# Patient Record
Sex: FEMALE | Race: BLACK OR AFRICAN AMERICAN | NOT HISPANIC OR LATINO | URBAN - METROPOLITAN AREA
[De-identification: names, ages, dates, MRNs, and addresses within clinical notes are randomized per-mention and may not be internally consistent; named-entity substitution may affect disease eponyms.]

---

## 2017-09-29 ENCOUNTER — EMERGENCY (EMERGENCY)
Facility: HOSPITAL | Age: 47
LOS: 1 days | Discharge: HOME | End: 2017-09-29
Attending: EMERGENCY MEDICINE | Admitting: EMERGENCY MEDICINE
Payer: COMMERCIAL

## 2017-09-29 VITALS
HEART RATE: 50 BPM | DIASTOLIC BLOOD PRESSURE: 65 MMHG | SYSTOLIC BLOOD PRESSURE: 104 MMHG | RESPIRATION RATE: 18 BRPM | OXYGEN SATURATION: 100 % | TEMPERATURE: 98 F

## 2017-09-29 DIAGNOSIS — Z93.2 ILEOSTOMY STATUS: Chronic | ICD-10-CM

## 2017-09-29 DIAGNOSIS — K61.1 RECTAL ABSCESS: Chronic | ICD-10-CM

## 2017-09-29 PROCEDURE — 73630 X-RAY EXAM OF FOOT: CPT | Mod: 26,RT

## 2017-09-29 PROCEDURE — 93971 EXTREMITY STUDY: CPT | Mod: 26,LT

## 2017-09-29 PROCEDURE — 99284 EMERGENCY DEPT VISIT MOD MDM: CPT

## 2017-09-29 NOTE — ED PROVIDER NOTE - PROGRESS NOTE DETAILS
The scribe's documentation has been prepared under my direction and personally reviewed by me in its entirety. I confirm that the note above accurately reflects all work, treatment, procedures, and medical decicion-making performed by me.  Robert Butler MD BIJAL Loomis- took sign out from Dr. Butler. Duplex doppler negative. Xray no acute fracture- confirmed and viewed by Dr. Heller. Will DC home with Podiatry referral lsit

## 2017-09-29 NOTE — ED PROVIDER NOTE - ATTENDING CONTRIBUTION TO CARE
agree with resident note  45 y/o F with a PMHx of Crohn's disease, presents to ED c/o right foot pain x2 weeks.  States concerned about DVT.  Also endorses wearing tight shoes.  Pain to dorsum of foot.    PE: well appearing, VSS, CTAB/L; s1 s2 no m/r/g ext: no cellulitis, negative homans signs; no calf swelling

## 2017-09-29 NOTE — ED PROVIDER NOTE - MEDICAL DECISION MAKING DETAILS
47 y/o F pt with right foot pain, concern for stress fx, will XR. Endorses some pain to calf , will do US to r/o DVT. Pt advised to f/u with podiatry.

## 2017-09-29 NOTE — ED PROVIDER NOTE - CARE PLAN
Principal Discharge DX:	Injury of right foot, initial encounter  Instructions for follow-up, activity and diet:	Follow up with your PMD within 24-48 hrs or call our clinic at [phone]. Rest and elevate your foot.  Apply ice for 20 minutes 2-3 times/day as needed for pain and swelling. Take Motrin 600mg every 6-8 hrs with food for pain.  Any worsening pain, swelling, weakness, numbness OR ANY NEW CONCERNING SYMPTOMS return to the Emergency Department. Podiatry referral list provided if symptoms do not improve or worsen.

## 2017-09-29 NOTE — ED PROVIDER NOTE - OBJECTIVE STATEMENT
45 y/o F with a PMHx of Crohn's disease, presents to ED c/o right foot pain x2 weeks. Pt states that pain began after wearing uncomfortable sandals and has persisted. Pt reports pain is on the dorsum of the foot shooting upward to calf with numbness and tingling. Pt states there is no exacerbating or relieving factors. Denies hx of DVT, hx of PE, CP, recent travel, sick contacts, or any other complaints.

## 2017-09-29 NOTE — ED PROVIDER NOTE - PLAN OF CARE
Follow up with your PMD within 24-48 hrs or call our clinic at [phone]. Rest and elevate your foot.  Apply ice for 20 minutes 2-3 times/day as needed for pain and swelling. Take Motrin 600mg every 6-8 hrs with food for pain.  Any worsening pain, swelling, weakness, numbness OR ANY NEW CONCERNING SYMPTOMS return to the Emergency Department. Podiatry referral list provided if symptoms do not improve or worsen.

## 2019-05-09 ENCOUNTER — OUTPATIENT (OUTPATIENT)
Dept: OUTPATIENT SERVICES | Facility: HOSPITAL | Age: 49
LOS: 1 days | End: 2019-05-09
Payer: COMMERCIAL

## 2019-05-09 ENCOUNTER — APPOINTMENT (OUTPATIENT)
Dept: CT IMAGING | Facility: CLINIC | Age: 49
End: 2019-05-09
Payer: COMMERCIAL

## 2019-05-09 DIAGNOSIS — K61.1 RECTAL ABSCESS: Chronic | ICD-10-CM

## 2019-05-09 DIAGNOSIS — K11.20 SIALOADENITIS, UNSPECIFIED: ICD-10-CM

## 2019-05-09 DIAGNOSIS — Z93.2 ILEOSTOMY STATUS: Chronic | ICD-10-CM

## 2019-05-09 PROCEDURE — 70491 CT SOFT TISSUE NECK W/DYE: CPT | Mod: 26

## 2019-05-09 PROCEDURE — 70491 CT SOFT TISSUE NECK W/DYE: CPT

## 2020-10-26 ENCOUNTER — APPOINTMENT (OUTPATIENT)
Dept: SURGERY | Facility: CLINIC | Age: 50
End: 2020-10-26
Payer: COMMERCIAL

## 2020-10-26 VITALS
DIASTOLIC BLOOD PRESSURE: 74 MMHG | BODY MASS INDEX: 33.82 KG/M2 | HEART RATE: 71 BPM | SYSTOLIC BLOOD PRESSURE: 112 MMHG | WEIGHT: 203 LBS | HEIGHT: 65 IN

## 2020-10-26 PROCEDURE — 99072 ADDL SUPL MATRL&STAF TM PHE: CPT

## 2020-10-26 PROCEDURE — 99204 OFFICE O/P NEW MOD 45 MIN: CPT

## 2020-10-26 NOTE — HISTORY OF PRESENT ILLNESS
[de-identified] : 49 yr old female patient with progressively enlarging soft tissue tumor mass, ? lipoma, measuring 10 x 8 cm in posterior mid lower neck shoulder girdle area,

## 2020-10-26 NOTE — PHYSICAL EXAM
[de-identified] : 10 x 8 cm soft tissue tumor mass of posterior lower neck/ shouder girdle area, possibe deep seated lipoma [Midline] : located in midline position [Normal] : orientation to person, place, and time: normal

## 2020-10-26 NOTE — ASSESSMENT
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, large , 10 x 8 cm., in posterior lower neck, progressively enlarging ,\par Concur with patient's wishes for resection because of size and progressive enlargement,\par Will need MRI image assessment for both extent & depth of mass,\par MRI scheduled,\par Will return after MRI for further discussions  prior to scheduling surgery,,\par Inherent complications of this type of surgery, indication, risk/benefit ratio explained,\par Proposed plan of management including possible treatment alternatives, pros & cons, and risks/benefits ratio discussed fully with patient and all questions answered to patient's satisfaction.\par \par

## 2020-11-02 ENCOUNTER — APPOINTMENT (OUTPATIENT)
Dept: OTOLARYNGOLOGY | Facility: CLINIC | Age: 50
End: 2020-11-02
Payer: COMMERCIAL

## 2020-11-02 DIAGNOSIS — K11.9 DISEASE OF SALIVARY GLAND, UNSPECIFIED: ICD-10-CM

## 2020-11-02 DIAGNOSIS — K02.9 DENTAL CARIES, UNSPECIFIED: ICD-10-CM

## 2020-11-02 DIAGNOSIS — M26.609 UNSPECIFIED TEMPOROMANDIBULAR JOINT DISORDER: ICD-10-CM

## 2020-11-02 PROCEDURE — 99203 OFFICE O/P NEW LOW 30 MIN: CPT

## 2020-11-02 PROCEDURE — 99072 ADDL SUPL MATRL&STAF TM PHE: CPT

## 2020-11-02 RX ORDER — BRIMONIDINE TARTRATE, TIMOLOL MALEATE 2; 5 MG/ML; MG/ML
SOLUTION/ DROPS OPHTHALMIC
Refills: 0 | Status: ACTIVE | COMMUNITY

## 2020-11-02 RX ORDER — TRAVOPROST (BENZALKONIUM) 0.004 %
0 DROPS OPHTHALMIC (EYE)
Refills: 0 | Status: ACTIVE | COMMUNITY

## 2020-11-02 NOTE — HISTORY OF PRESENT ILLNESS
[de-identified] : 49F here for eval for gland swelling and pressure in head with otalgia behind the right ear- started over a year ago- saw dentist about 2 weeks ago- told has infection on one of the right lower molars. Recommending removal of teeth.  No otorrhea  - swollen parotid AD - no abx used.  Currently on amoxicillin for the teeth - helping. No pain with biting down .

## 2020-11-02 NOTE — PHYSICAL EXAM
[Midline] : trachea located in midline position [Normal] : no rashes [FreeTextEntry1] : + opening click R>L  [de-identified] : normal salivary flow - parotid and salivary glands

## 2020-11-04 ENCOUNTER — APPOINTMENT (OUTPATIENT)
Dept: GASTROENTEROLOGY | Facility: CLINIC | Age: 50
End: 2020-11-04
Payer: COMMERCIAL

## 2020-11-04 VITALS
SYSTOLIC BLOOD PRESSURE: 122 MMHG | HEART RATE: 76 BPM | TEMPERATURE: 97.8 F | OXYGEN SATURATION: 98 % | DIASTOLIC BLOOD PRESSURE: 76 MMHG | BODY MASS INDEX: 33.99 KG/M2 | HEIGHT: 65 IN | RESPIRATION RATE: 16 BRPM | WEIGHT: 204 LBS

## 2020-11-04 DIAGNOSIS — K56.699 OTHER INTESTINAL OBSTRUCTION UNSPECIFIED AS TO PARTIAL VERSUS COMPLETE OBSTRUCTION: ICD-10-CM

## 2020-11-04 DIAGNOSIS — K50.90 CROHN'S DISEASE, UNSPECIFIED, W/OUT COMPLICATIONS: ICD-10-CM

## 2020-11-04 PROCEDURE — 99072 ADDL SUPL MATRL&STAF TM PHE: CPT

## 2020-11-04 PROCEDURE — 99203 OFFICE O/P NEW LOW 30 MIN: CPT

## 2020-11-04 NOTE — ASSESSMENT
[FreeTextEntry1] : h/o complicated Crohn's disease with rectal stricture, pt took herself off humira, h/o perforation of rectum in 2014 requiring ileostomy during dilation of stricture.\par \par per patient her current GI physcian was unable to do Flex sig last year due to narrowing. Pt has colorectal surgeon at [facility] and is having MRE done today. \par \par No records or labs available for review today.\par \par plan patient advised to see IBD specialist and to work with a surgeon who specializes in IBD given her complicated history,  and  given as referrals.

## 2020-11-04 NOTE — PHYSICAL EXAM
[General Appearance - Alert] : alert [General Appearance - In No Acute Distress] : in no acute distress [General Appearance - Well Nourished] : well nourished [General Appearance - Well Developed] : well developed [Sclera] : the sclera and conjunctiva were normal [Hearing Threshold Finger Rub Not Alexander] : hearing was normal [Respiration, Rhythm And Depth] : normal respiratory rhythm and effort [Auscultation Breath Sounds / Voice Sounds] : lungs were clear to auscultation bilaterally [Heart Rate And Rhythm] : heart rate was normal and rhythm regular [Heart Sounds] : normal S1 and S2 [Bowel Sounds] : normal bowel sounds [Abdomen Soft] : soft [No CVA Tenderness] : no ~M costovertebral angle tenderness [Nail Clubbing] : no clubbing  or cyanosis of the fingernails [FreeTextEntry1] : well healed scar [Abnormal Walk] : normal gait [] : no rash [Skin Lesions] : no skin lesions [No Focal Deficits] : no focal deficits [Oriented To Time, Place, And Person] : oriented to person, place, and time

## 2020-11-04 NOTE — HISTORY OF PRESENT ILLNESS
[FreeTextEntry1] : 50 yo female with h/o Crohn;s disease in 1986 , h/o anal stricture, s/p ileostomy in 2013 after rectal perforation from dilation, patient s/p reanastamosis in 2014. Patient seeing  GI   currently.\par Patient's last visit was last year.  Patient seeing  and had physical done. Last blood work in 05/2020\par \par Patient  was on humira and unable to have Flex sigmoidoscopy due to narrowing of rectum, and patient was seeing a colorectal surgeon and having MRE done. Patient stopped humiral on her own fear of covid.\par only took 2 months of humiral last fall.\par \par Patient reports 2 or 3 loose bms daily,  can be large amounts. , no brbpr, no melena. patient reports right sided abdominal pain, 5/10 pain scale, sharp, lasts for minutes, not worse with food, does not radiate to back . no weight loss.

## 2020-11-04 NOTE — REVIEW OF SYSTEMS
[As Noted in HPI] : as noted in HPI [Fever] : no fever [Chills] : no chills [Eyesight Problems] : no eyesight problems [Nosebleeds] : no nosebleeds [Chest Pain] : no chest pain [Pelvic Pain] : no pelvic pain [Arthralgias] : no arthralgias [Joint Pain] : no joint pain [Dizziness] : no dizziness [Fainting] : no fainting [Anxiety] : no anxiety [Depression] : no depression [Muscle Weakness] : no muscle weakness [Easy Bleeding] : no tendency for easy bleeding [Easy Bruising] : no tendency for easy bruising

## 2020-11-10 ENCOUNTER — APPOINTMENT (OUTPATIENT)
Dept: CT IMAGING | Facility: HOSPITAL | Age: 50
End: 2020-11-10

## 2020-11-16 ENCOUNTER — APPOINTMENT (OUTPATIENT)
Dept: DERMATOLOGY | Facility: CLINIC | Age: 50
End: 2020-11-16

## 2020-11-16 ENCOUNTER — APPOINTMENT (OUTPATIENT)
Dept: DERMATOLOGY | Facility: CLINIC | Age: 50
End: 2020-11-16
Payer: COMMERCIAL

## 2020-11-16 VITALS — WEIGHT: 203 LBS | BODY MASS INDEX: 33.82 KG/M2 | HEIGHT: 65 IN

## 2020-11-16 DIAGNOSIS — R22.9 LOCALIZED SWELLING, MASS AND LUMP, UNSPECIFIED: ICD-10-CM

## 2020-11-16 PROCEDURE — 99072 ADDL SUPL MATRL&STAF TM PHE: CPT

## 2020-11-16 PROCEDURE — 99203 OFFICE O/P NEW LOW 30 MIN: CPT

## 2020-11-24 ENCOUNTER — APPOINTMENT (OUTPATIENT)
Dept: MRI IMAGING | Facility: HOSPITAL | Age: 50
End: 2020-11-24

## 2020-11-25 ENCOUNTER — RESULT REVIEW (OUTPATIENT)
Age: 50
End: 2020-11-25

## 2020-11-30 ENCOUNTER — NON-APPOINTMENT (OUTPATIENT)
Age: 50
End: 2020-11-30

## 2020-12-01 ENCOUNTER — OUTPATIENT (OUTPATIENT)
Dept: OUTPATIENT SERVICES | Facility: HOSPITAL | Age: 50
LOS: 1 days | End: 2020-12-01
Payer: COMMERCIAL

## 2020-12-01 ENCOUNTER — RESULT REVIEW (OUTPATIENT)
Age: 50
End: 2020-12-01

## 2020-12-01 ENCOUNTER — APPOINTMENT (OUTPATIENT)
Dept: MRI IMAGING | Facility: CLINIC | Age: 50
End: 2020-12-01
Payer: COMMERCIAL

## 2020-12-01 DIAGNOSIS — K61.1 RECTAL ABSCESS: Chronic | ICD-10-CM

## 2020-12-01 DIAGNOSIS — Z93.2 ILEOSTOMY STATUS: Chronic | ICD-10-CM

## 2020-12-01 DIAGNOSIS — R22.1 LOCALIZED SWELLING, MASS AND LUMP, NECK: ICD-10-CM

## 2020-12-01 PROCEDURE — 70543 MRI ORBT/FAC/NCK W/O &W/DYE: CPT | Mod: 26

## 2020-12-01 PROCEDURE — A9585: CPT

## 2020-12-01 PROCEDURE — 70543 MRI ORBT/FAC/NCK W/O &W/DYE: CPT

## 2020-12-14 ENCOUNTER — APPOINTMENT (OUTPATIENT)
Dept: SURGERY | Facility: CLINIC | Age: 50
End: 2020-12-14
Payer: COMMERCIAL

## 2020-12-14 PROCEDURE — 99072 ADDL SUPL MATRL&STAF TM PHE: CPT

## 2020-12-14 PROCEDURE — 99214 OFFICE O/P EST MOD 30 MIN: CPT

## 2020-12-14 NOTE — ASSESSMENT
[FreeTextEntry1] : Based on recent MRI , will observe conservatively and reserve resection only if this is an enlarging lipoma.\par Proposed plan of management including possible treatment alternatives, pros & cons, and risks/benefits ratio discussed fully with patient and all questions answered to patient's satisfaction.\par Return in 4 months

## 2020-12-14 NOTE — HISTORY OF PRESENT ILLNESS
[de-identified] : MRI results demonstrate poorly encapsulated fatty tissue  compatible more with lipodystrophy fat redistribution in setting of Cushing's syndrome,\par Patient has Crohn's disease and had been on steroids as well as other meds that may  have contributed to this,  \par clinical size : 11 x 8 cm\par MRI : 9.5 x 6.1 cm\par

## 2020-12-22 ENCOUNTER — APPOINTMENT (OUTPATIENT)
Dept: PLASTIC SURGERY | Facility: CLINIC | Age: 50
End: 2020-12-22
Payer: COMMERCIAL

## 2020-12-22 VITALS
DIASTOLIC BLOOD PRESSURE: 58 MMHG | HEIGHT: 65 IN | BODY MASS INDEX: 33.82 KG/M2 | WEIGHT: 203 LBS | HEART RATE: 61 BPM | TEMPERATURE: 97.2 F | OXYGEN SATURATION: 100 % | SYSTOLIC BLOOD PRESSURE: 92 MMHG

## 2020-12-22 PROCEDURE — XXXXX: CPT

## 2020-12-22 NOTE — REASON FOR VISIT
[Initial Evaluation] : an initial evaluation [FreeTextEntry1] : 50 year old lady is in the office today due to lipoma on right neck. She had this for 18 years and grow in size 5 years. Imaging (MRI) 12/01/2020. No treatment , biopsy in 2012. No family hx of skin cancer. Otherwise the patient is doing well.

## 2021-03-04 ENCOUNTER — EMERGENCY (EMERGENCY)
Facility: HOSPITAL | Age: 51
LOS: 1 days | Discharge: HOME | End: 2021-03-04
Attending: STUDENT IN AN ORGANIZED HEALTH CARE EDUCATION/TRAINING PROGRAM
Payer: COMMERCIAL

## 2021-03-04 VITALS
HEART RATE: 59 BPM | RESPIRATION RATE: 17 BRPM | DIASTOLIC BLOOD PRESSURE: 60 MMHG | TEMPERATURE: 98 F | SYSTOLIC BLOOD PRESSURE: 128 MMHG | OXYGEN SATURATION: 98 %

## 2021-03-04 VITALS
OXYGEN SATURATION: 100 % | TEMPERATURE: 98 F | SYSTOLIC BLOOD PRESSURE: 165 MMHG | HEART RATE: 61 BPM | WEIGHT: 203.93 LBS | DIASTOLIC BLOOD PRESSURE: 103 MMHG | RESPIRATION RATE: 18 BRPM | HEIGHT: 65 IN

## 2021-03-04 DIAGNOSIS — Z93.2 ILEOSTOMY STATUS: Chronic | ICD-10-CM

## 2021-03-04 DIAGNOSIS — K61.1 RECTAL ABSCESS: Chronic | ICD-10-CM

## 2021-03-04 PROCEDURE — 99283 EMERGENCY DEPT VISIT LOW MDM: CPT

## 2021-03-04 RX ORDER — ACETAMINOPHEN 500 MG
650 TABLET ORAL ONCE
Refills: 0 | Status: COMPLETED | OUTPATIENT
Start: 2021-03-04 | End: 2021-03-04

## 2021-03-04 RX ADMIN — Medication 650 MILLIGRAM(S): at 03:57
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
## 2021-03-04 NOTE — ED PROVIDER NOTE - NS ED ROS FT
Gen: Denies fever.   HEENT: +headache. Denies congestion.  CV: Denies chest pain. Denies lightheadedness.  Skin: Denies rash.   Resp: Denies SOB. Denies cough.  GI: Denies abd pain. Denies nausea. Denies vomiting. Denies diarrhea. Denies melena. Denies hematochezia.  Msk: Denies extremity swelling. Denies extremity pain.  : Denies dysuria. Denies hematuria.  Neuro: Denies LOC. Denies dizziness. Denies new numbness/tingling. Denies new focal weakness.  Psych: Denies SI
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
## 2021-03-04 NOTE — ED PROVIDER NOTE - PROGRESS NOTE DETAILS
Jean Buck PGY1: Exam w/o concerning findings or neuro deficits. Pt comfortable with returning home with follow-up. Pt understands plan, and has been provided with return precautions, and understands reasons to return to the ER.

## 2021-03-04 NOTE — ED PROVIDER NOTE - NSFOLLOWUPINSTRUCTIONS_ED_ALL_ED_FT
1. You presented to the emergency department for:  head injury    2. Your evaluation in the emergency department included a physician evaluation. Your evaluation did not reveal any findings indicating the need for admission to the hospital or an emergent intervention at this time.     3. It is recommended that you follow-up with the concussion clinic as needed as discussed for a repeat evaluation, and potentially further testing and treatment. The contact information to arrange an appointment is available in your paper work.    Follow-up with the [facility] Address: [facility] [id number], [place] [address] Phone: [phone]    If needed, to arrange an appointment with a primary care provider please call: [phone]    4. For headache you may take 500-1000mg Tylenol every 8 hours - as needed.  This is an over-the-counter medication - please read the warnings on the label. This medication comes with certain risks and side effects that are outlined on the packaging, especially if you are taking it for a prolonged period of time. If you have any questions regarding its use, you may refer them to your local pharmacist.    5. PLEASE RETURN TO THE EMERGENCY DEPARTMENT IMMEDIATELY IF you have any fevers, uncontrollable nausea and vomiting, lightheadedness, inability to tolerate eating and drinking, difficulty breathing, chest pain, severe increase in your symptoms or pain, or an other new symptoms that concern you.
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
## 2021-03-04 NOTE — ED PROVIDER NOTE - PATIENT PORTAL LINK FT
You can access the FollowMyHealth Patient Portal offered by [facility] by registering at the following website: [email or web address]. By joining [facility]’s FollowMyHealth portal, you will also be able to view your health information using other applications (apps) compatible with our system.
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
## 2021-10-06 ENCOUNTER — TRANSCRIPTION ENCOUNTER (OUTPATIENT)
Age: 51
End: 2021-10-06

## 2021-10-27 ENCOUNTER — TRANSCRIPTION ENCOUNTER (OUTPATIENT)
Age: 51
End: 2021-10-27

## 2021-11-30 ENCOUNTER — APPOINTMENT (OUTPATIENT)
Dept: SURGERY | Facility: CLINIC | Age: 51
End: 2021-11-30
Payer: COMMERCIAL

## 2021-11-30 VITALS
HEART RATE: 66 BPM | SYSTOLIC BLOOD PRESSURE: 163 MMHG | BODY MASS INDEX: 34.99 KG/M2 | DIASTOLIC BLOOD PRESSURE: 82 MMHG | WEIGHT: 210 LBS | HEIGHT: 65 IN

## 2021-11-30 PROCEDURE — 99203 OFFICE O/P NEW LOW 30 MIN: CPT

## 2021-12-04 NOTE — CONSULT LETTER
[Dear  ___] : Dear  [unfilled], [Consult Letter:] : I had the pleasure of evaluating your patient, [unfilled]. [Please see my note below.] : Please see my note below. [Consult Closing:] : Thank you very much for allowing me to participate in the care of this patient.  If you have any questions, please do not hesitate to contact me. [Sincerely,] : Sincerely, [FreeTextEntry2] : Dr. Lilo Astudillo [FreeTextEntry3] : Royal Andersen MD, FACS\par System Director, Endocrine Surgery\par [facility]\par Assistant Clinical Professor of Surgery\par [employer] [facility] [employer] [facility]

## 2021-12-04 NOTE — HISTORY OF PRESENT ILLNESS
[de-identified] : 20 year history of enlarging neck mass. denies pain, drainage, infection, history of  trauma, shoulder weakness or sensory changes. MRI shows 9 cm fatty tissue prominence.  I have reviewed all old and new data and available images.\par

## 2021-12-04 NOTE — ASSESSMENT
[FreeTextEntry1] : appears to be lipoma. discussed options for management including active surveillance vs excision.  potential for recurrence, scarring, sensory changes and symmetry discussed. risks, benefits and alternatives discussed at length.  I have discussed with the patient the anatomy of the area, the pathophysiology of the disease process and the rationale for surgery.  The attendant risks, possible complications and expected postoperative course have been discussed in detail.  I have given the patient the opportunity to ask questions, and all questions have been answered to the patient's satisfaction, and they wish to proceed with the planned procedure.  to be scheduled ambulatory at [facility]

## 2021-12-04 NOTE — PHYSICAL EXAM
[de-identified] : 10 cm right posterior neck mass adjacent to C7/T1 spinal process, soft, mobile [de-identified] : no palpable thyroid nodules [Midline] : located in midline position [Normal] : orientation to person, place, and time: normal

## 2021-12-12 ENCOUNTER — EMERGENCY (EMERGENCY)
Facility: HOSPITAL | Age: 51
LOS: 1 days | Discharge: HOME | End: 2021-12-12
Attending: STUDENT IN AN ORGANIZED HEALTH CARE EDUCATION/TRAINING PROGRAM
Payer: COMMERCIAL

## 2021-12-12 VITALS
HEART RATE: 64 BPM | HEIGHT: 65 IN | WEIGHT: 210.1 LBS | TEMPERATURE: 98 F | DIASTOLIC BLOOD PRESSURE: 81 MMHG | RESPIRATION RATE: 16 BRPM | OXYGEN SATURATION: 98 % | SYSTOLIC BLOOD PRESSURE: 171 MMHG

## 2021-12-12 DIAGNOSIS — Z93.2 ILEOSTOMY STATUS: Chronic | ICD-10-CM

## 2021-12-12 DIAGNOSIS — K61.1 RECTAL ABSCESS: Chronic | ICD-10-CM

## 2021-12-12 PROCEDURE — 99285 EMERGENCY DEPT VISIT HI MDM: CPT | Mod: 25

## 2021-12-12 PROCEDURE — 93010 ELECTROCARDIOGRAM REPORT: CPT | Mod: NC

## 2021-12-13 VITALS
SYSTOLIC BLOOD PRESSURE: 113 MMHG | OXYGEN SATURATION: 98 % | HEART RATE: 60 BPM | RESPIRATION RATE: 16 BRPM | TEMPERATURE: 98 F | DIASTOLIC BLOOD PRESSURE: 73 MMHG

## 2021-12-13 LAB
ALBUMIN SERPL ELPH-MCNC: 4.4 G/DL — SIGNIFICANT CHANGE UP (ref 3.3–5)
ALP SERPL-CCNC: 94 U/L — SIGNIFICANT CHANGE UP (ref 40–120)
ALT FLD-CCNC: 15 U/L — SIGNIFICANT CHANGE UP (ref 10–45)
ANION GAP SERPL CALC-SCNC: 15 MMOL/L — SIGNIFICANT CHANGE UP (ref 5–17)
AST SERPL-CCNC: 22 U/L — SIGNIFICANT CHANGE UP (ref 10–40)
BASOPHILS # BLD AUTO: 0 K/UL — SIGNIFICANT CHANGE UP (ref 0–0.2)
BASOPHILS NFR BLD AUTO: 0 % — SIGNIFICANT CHANGE UP (ref 0–2)
BILIRUB SERPL-MCNC: 0.5 MG/DL — SIGNIFICANT CHANGE UP (ref 0.2–1.2)
BUN SERPL-MCNC: 19 MG/DL — SIGNIFICANT CHANGE UP (ref 7–23)
CALCIUM SERPL-MCNC: 9.5 MG/DL — SIGNIFICANT CHANGE UP (ref 8.4–10.5)
CHLORIDE SERPL-SCNC: 106 MMOL/L — SIGNIFICANT CHANGE UP (ref 96–108)
CO2 SERPL-SCNC: 17 MMOL/L — LOW (ref 22–31)
CREAT SERPL-MCNC: 0.73 MG/DL — SIGNIFICANT CHANGE UP (ref 0.5–1.3)
EOSINOPHIL # BLD AUTO: 0.27 K/UL — SIGNIFICANT CHANGE UP (ref 0–0.5)
EOSINOPHIL NFR BLD AUTO: 3.4 % — SIGNIFICANT CHANGE UP (ref 0–6)
GLUCOSE SERPL-MCNC: 103 MG/DL — HIGH (ref 70–99)
HCT VFR BLD CALC: 35.1 % — SIGNIFICANT CHANGE UP (ref 34.5–45)
HGB BLD-MCNC: 11.1 G/DL — LOW (ref 11.5–15.5)
LYMPHOCYTES # BLD AUTO: 2.15 K/UL — SIGNIFICANT CHANGE UP (ref 1–3.3)
LYMPHOCYTES # BLD AUTO: 27.3 % — SIGNIFICANT CHANGE UP (ref 13–44)
MAGNESIUM SERPL-MCNC: 1.6 MG/DL — SIGNIFICANT CHANGE UP (ref 1.6–2.6)
MCHC RBC-ENTMCNC: 26.6 PG — LOW (ref 27–34)
MCHC RBC-ENTMCNC: 31.6 GM/DL — LOW (ref 32–36)
MCV RBC AUTO: 84 FL — SIGNIFICANT CHANGE UP (ref 80–100)
MONOCYTES # BLD AUTO: 0.81 K/UL — SIGNIFICANT CHANGE UP (ref 0–0.9)
MONOCYTES NFR BLD AUTO: 10.3 % — SIGNIFICANT CHANGE UP (ref 2–14)
NEUTROPHILS # BLD AUTO: 4.51 K/UL — SIGNIFICANT CHANGE UP (ref 1.8–7.4)
NEUTROPHILS NFR BLD AUTO: 57.3 % — SIGNIFICANT CHANGE UP (ref 43–77)
PLATELET # BLD AUTO: 226 K/UL — SIGNIFICANT CHANGE UP (ref 150–400)
POTASSIUM SERPL-MCNC: 3.9 MMOL/L — SIGNIFICANT CHANGE UP (ref 3.5–5.3)
POTASSIUM SERPL-SCNC: 3.9 MMOL/L — SIGNIFICANT CHANGE UP (ref 3.5–5.3)
PROT SERPL-MCNC: 8 G/DL — SIGNIFICANT CHANGE UP (ref 6–8.3)
RBC # BLD: 4.18 M/UL — SIGNIFICANT CHANGE UP (ref 3.8–5.2)
RBC # FLD: 15.3 % — HIGH (ref 10.3–14.5)
SARS-COV-2 RNA SPEC QL NAA+PROBE: SIGNIFICANT CHANGE UP
SODIUM SERPL-SCNC: 138 MMOL/L — SIGNIFICANT CHANGE UP (ref 135–145)
TROPONIN T, HIGH SENSITIVITY RESULT: 7 NG/L — SIGNIFICANT CHANGE UP (ref 0–51)
TROPONIN T, HIGH SENSITIVITY RESULT: 7 NG/L — SIGNIFICANT CHANGE UP (ref 0–51)
WBC # BLD: 7.87 K/UL — SIGNIFICANT CHANGE UP (ref 3.8–10.5)
WBC # FLD AUTO: 7.87 K/UL — SIGNIFICANT CHANGE UP (ref 3.8–10.5)

## 2021-12-13 PROCEDURE — 71046 X-RAY EXAM CHEST 2 VIEWS: CPT

## 2021-12-13 PROCEDURE — 83735 ASSAY OF MAGNESIUM: CPT

## 2021-12-13 PROCEDURE — 71046 X-RAY EXAM CHEST 2 VIEWS: CPT | Mod: 26

## 2021-12-13 PROCEDURE — 85025 COMPLETE CBC W/AUTO DIFF WBC: CPT

## 2021-12-13 PROCEDURE — 99284 EMERGENCY DEPT VISIT MOD MDM: CPT | Mod: 25

## 2021-12-13 PROCEDURE — 87635 SARS-COV-2 COVID-19 AMP PRB: CPT

## 2021-12-13 PROCEDURE — 93005 ELECTROCARDIOGRAM TRACING: CPT

## 2021-12-13 PROCEDURE — 84484 ASSAY OF TROPONIN QUANT: CPT

## 2021-12-13 PROCEDURE — 36415 COLL VENOUS BLD VENIPUNCTURE: CPT

## 2021-12-13 PROCEDURE — 80053 COMPREHEN METABOLIC PANEL: CPT

## 2021-12-13 RX ORDER — ASPIRIN/CALCIUM CARB/MAGNESIUM 324 MG
162 TABLET ORAL ONCE
Refills: 0 | Status: COMPLETED | OUTPATIENT
Start: 2021-12-13 | End: 2021-12-13

## 2021-12-13 RX ADMIN — Medication 162 MILLIGRAM(S): at 02:36

## 2021-12-13 NOTE — ED PROVIDER NOTE - CLINICAL SUMMARY MEDICAL DECISION MAKING FREE TEXT BOX
52 y/o female with pmhx of Crohns diease presenting with palpitations with recent dental procedure yesterday, well appearing, afebrile, hemodynamically stable with no chest pain. Cardiac w/u; EKG/Trop/CXR and will check labs for signs of infection vs. electrolyte abnormalities 50 y/o female with pmhx of Crohns diease presenting with palpitations with recent dental procedure yesterday, well appearing, afebrile, hemodynamically stable with no chest pain. Cardiac w/u; EKG/Trop/CXR and will check labs for signs of infection vs. electrolyte abnormalities    Attending MD Welch: Agree with above. Ddx includes ACS vs PE vs arrythmia vs lyte abnormality.   Doubt ACS given patient not having overt CP, but will send trop.  Doubt PE given patient not tachy, not hypoxic, no SOB, no CP, no signs or sxs of DVT.   Doubt arrythmia given EKG and intervals normal.   Will eval lyte abnormality with labs.

## 2021-12-13 NOTE — ED PROVIDER NOTE - NSFOLLOWUPINSTRUCTIONS_ED_ALL_ED_FT
Please follow up with your cardiologist as we discussed     A palpitation is the feeling that your heartbeat is irregular or is faster than normal. It may feel like your heart is fluttering or skipping a beat. They may be caused by many things, including smoking, caffeine, alcohol, stress, and certain medicines. Although most causes of palpitations are not serious, palpitations can be a sign of a serious medical problem. Avoid caffeine, alcohol, and tobacco products at home. Try to reduce stress and anxiety and make sure to get plenty of rest.     SEEK IMMEDIATE MEDICAL CARE IF YOU HAVE ANY OF THE FOLLOWING SYMPTOMS: chest pain, shortness of breath, severe headache, dizziness/lightheadedness, or fainting.

## 2021-12-13 NOTE — ED PROVIDER NOTE - OBJECTIVE STATEMENT
52 y/o female with pmhx of crohns diease presenting with palpitations. Recent dental procedure yesterday and has been taking abx for 1 week (amoxicillin and recently switched to augmentin). Palpitations started in the afternoon after taking abx and cleaning around the house, have been intermittent, with no associated chest pain, diaphoresis, SOB, weakness, numbness/tingling, LOC, fevers/chills, n/v/d, cough, rashes, or changes in urination. Has noted persisted swelling in mouth since procedure but denies discharge, bleeding, or bad taste.

## 2021-12-13 NOTE — ED PROVIDER NOTE - PHYSICAL EXAMINATION
Gen: WDWN, NAD, comfortable appearing, afebrile   HEENT: PERRLA, EOMI, no nasal discharge, mucous membranes moist, upper lip symmetric swelling with no oropharyngeal edema/erythema/exudates/bleeding   CV: RRR, +S1/S2, no M/R/G, equal b/l radial pulses 2+  Resp: CTAB, no W/R/R, SPO2 100% on RA, no increased WOB   GI: Abdomen soft non-distended, NTTP, no masses/organomegaly   MSK/Skin: No CVA tenderness, no open wounds, no bruising, no LE edema, no splinter hemorrhages   Neuro: CN2-12 grossly intact, A&Ox4, MS +5/5 in UE and LE BL, gross sensation intact in UE and LE BL  Psych: appropriate mood Attending MD Welch :   PHYSICAL EXAM:    GENERAL: NAD  HEENT: PERRLA, EOMI, no nasal discharge, mucous membranes moist, upper lip symmetric swelling with no oropharyngeal edema/erythema/exudates/bleeding   CHEST/LUNG: Chest rise equal bilaterally  HEART: Regular rate and rhythm  ABDOMEN: Soft, Nontender, Nondistended  EXTREMITIES:  Extremities warm  PSYCH: A&Ox3  SKIN: No obvious rashes or lesions

## 2021-12-13 NOTE — ED ADULT NURSE NOTE - OBJECTIVE STATEMENT
51y F arrived to the ED c/o palpitations. Pt presents with palpitations starting this afternoon while cleaning. Pt reports being recently placed on amoxicillin x1 week ago after a dental procedure and was switched to Augmentin and took first dose today. Pt endorses SOB with palpitations denies chest pain, HA, N/V/D, abdominal pain, fever/chills, urinary symptoms, hematuria, weakness, dizziness, numbness, tinging. Pt placed in position of comfort.  Pt denies needs at this time.

## 2021-12-13 NOTE — ED PROVIDER NOTE - NS ED ROS FT
Gen: Denies fever, weight loss  CV: Denies chest pain  Skin: Denies rash, erythema, color changes  Resp: Denies SOB, cough  Endo: Denies sensitivity to heat, cold, increased urination  GI: Denies diarrhea, constipation, nausea, vomiting  Msk: Denies back pain, LE swelling, extremity pain  : Denies dysuria, increased frequency  Neuro: Denies LOC, weakness, numbness/tingling

## 2021-12-13 NOTE — ED PROVIDER NOTE - PROGRESS NOTE DETAILS
Vaibhav, PGY-2  No palpitations, trop wnl, no chest pain, NSR on monitor; will d/c with cardiology f/u and return precautions Vaibhav, PGY-2  No palpitations, trop wnl, no chest pain, NSR on monitor; will d/c with cardiology f/u and return precautions. Patient asymptomatic on reassessment, will follow-up PMD.

## 2021-12-13 NOTE — ED ADULT NURSE NOTE - NSICDXPASTMEDICALHX_GEN_ALL_CORE_FT
PAST MEDICAL HISTORY:  Anxiety and depression     Crohn disease     Glaucoma     Ileostomy status

## 2021-12-13 NOTE — ED ADULT NURSE NOTE - NSIMPLEMENTINTERV_GEN_ALL_ED
Implemented All Universal Safety Interventions:  [place] to call system. Call bell, personal items and telephone within reach. Instruct patient to call for assistance. Room bathroom lighting operational. Non-slip footwear when patient is off stretcher. Physically safe environment: no spills, clutter or unnecessary equipment. Stretcher in lowest position, wheels locked, appropriate side rails in place.

## 2021-12-13 NOTE — ED ADULT NURSE NOTE - NSICDXPASTSURGICALHX_GEN_ALL_CORE_FT
PAST SURGICAL HISTORY:  Ileostomy status partial closure 3/2014    Rectal abscess I&D 1985    S/P ileostomy 2/11/13

## 2021-12-13 NOTE — ED PROVIDER NOTE - PATIENT PORTAL LINK FT
You can access the FollowMyHealth Patient Portal offered by [facility] by registering at the following website: [email or web address]. By joining [employer]’s FollowMyHealth portal, you will also be able to view your health information using other applications (apps) compatible with our system.

## 2021-12-13 NOTE — ED ADULT NURSE REASSESSMENT NOTE - NS ED NURSE REASSESS COMMENT FT1
Patient d/c. Reviewed d/c paperwork with patients, all questions answered at this time. Patient verbalizes understanding. IV removed. Patient instructed to return to the ER for any worsening s/s including chest pain, SOB, fever, n/v/d. Patient alert and stable at time of d/c. Patient will follow up with cardiology.

## 2021-12-14 NOTE — ED POST DISCHARGE NOTE - DETAILS
12/14 Barrington Ocampo PA-C: Called all #s in chart. s/w emergency contact cousin Kailash who will have pt call [phone] admin number. Appreciative of call. 12/15: spoke with patient made aware of recommendation - Sophia Chawla PA-C

## 2022-02-17 ENCOUNTER — OUTPATIENT (OUTPATIENT)
Dept: OUTPATIENT SERVICES | Facility: HOSPITAL | Age: 52
LOS: 1 days | End: 2022-02-17

## 2022-02-17 VITALS
WEIGHT: 216.05 LBS | HEART RATE: 68 BPM | SYSTOLIC BLOOD PRESSURE: 120 MMHG | TEMPERATURE: 97 F | DIASTOLIC BLOOD PRESSURE: 70 MMHG | OXYGEN SATURATION: 99 % | HEIGHT: 65 IN | RESPIRATION RATE: 16 BRPM

## 2022-02-17 DIAGNOSIS — Z93.2 ILEOSTOMY STATUS: Chronic | ICD-10-CM

## 2022-02-17 DIAGNOSIS — D48.5 NEOPLASM OF UNCERTAIN BEHAVIOR OF SKIN: ICD-10-CM

## 2022-02-17 DIAGNOSIS — G47.33 OBSTRUCTIVE SLEEP APNEA (ADULT) (PEDIATRIC): ICD-10-CM

## 2022-02-17 DIAGNOSIS — Z86.69 PERSONAL HISTORY OF OTHER DISEASES OF THE NERVOUS SYSTEM AND SENSE ORGANS: ICD-10-CM

## 2022-02-17 DIAGNOSIS — K61.1 RECTAL ABSCESS: Chronic | ICD-10-CM

## 2022-02-17 DIAGNOSIS — R73.03 PREDIABETES: ICD-10-CM

## 2022-02-17 LAB
A1C WITH ESTIMATED AVERAGE GLUCOSE RESULT: 5.5 % — SIGNIFICANT CHANGE UP (ref 4–5.6)
ALBUMIN SERPL ELPH-MCNC: 4.4 G/DL — SIGNIFICANT CHANGE UP (ref 3.3–5)
ALP SERPL-CCNC: 95 U/L — SIGNIFICANT CHANGE UP (ref 40–120)
ALT FLD-CCNC: 20 U/L — SIGNIFICANT CHANGE UP (ref 4–33)
ANION GAP SERPL CALC-SCNC: 12 MMOL/L — SIGNIFICANT CHANGE UP (ref 7–14)
AST SERPL-CCNC: 22 U/L — SIGNIFICANT CHANGE UP (ref 4–32)
BILIRUB SERPL-MCNC: 0.7 MG/DL — SIGNIFICANT CHANGE UP (ref 0.2–1.2)
BLD GP AB SCN SERPL QL: NEGATIVE — SIGNIFICANT CHANGE UP
BUN SERPL-MCNC: 18 MG/DL — SIGNIFICANT CHANGE UP (ref 7–23)
CALCIUM SERPL-MCNC: 9.2 MG/DL — SIGNIFICANT CHANGE UP (ref 8.4–10.5)
CHLORIDE SERPL-SCNC: 105 MMOL/L — SIGNIFICANT CHANGE UP (ref 98–107)
CO2 SERPL-SCNC: 23 MMOL/L — SIGNIFICANT CHANGE UP (ref 22–31)
CREAT SERPL-MCNC: 0.81 MG/DL — SIGNIFICANT CHANGE UP (ref 0.5–1.3)
ESTIMATED AVERAGE GLUCOSE: 111 — SIGNIFICANT CHANGE UP
GLUCOSE SERPL-MCNC: 96 MG/DL — SIGNIFICANT CHANGE UP (ref 70–99)
HCG SERPL-ACNC: <5 MIU/ML — SIGNIFICANT CHANGE UP
HCT VFR BLD CALC: 35.4 % — SIGNIFICANT CHANGE UP (ref 34.5–45)
HGB BLD-MCNC: 10.9 G/DL — LOW (ref 11.5–15.5)
MCHC RBC-ENTMCNC: 26.8 PG — LOW (ref 27–34)
MCHC RBC-ENTMCNC: 30.8 GM/DL — LOW (ref 32–36)
MCV RBC AUTO: 87 FL — SIGNIFICANT CHANGE UP (ref 80–100)
NRBC # BLD: 0 /100 WBCS — SIGNIFICANT CHANGE UP
NRBC # FLD: 0 K/UL — SIGNIFICANT CHANGE UP
PLATELET # BLD AUTO: 192 K/UL — SIGNIFICANT CHANGE UP (ref 150–400)
POTASSIUM SERPL-MCNC: 4.4 MMOL/L — SIGNIFICANT CHANGE UP (ref 3.5–5.3)
POTASSIUM SERPL-SCNC: 4.4 MMOL/L — SIGNIFICANT CHANGE UP (ref 3.5–5.3)
PROT SERPL-MCNC: 7.2 G/DL — SIGNIFICANT CHANGE UP (ref 6–8.3)
RBC # BLD: 4.07 M/UL — SIGNIFICANT CHANGE UP (ref 3.8–5.2)
RBC # FLD: 16.3 % — HIGH (ref 10.3–14.5)
RH IG SCN BLD-IMP: POSITIVE — SIGNIFICANT CHANGE UP
SODIUM SERPL-SCNC: 140 MMOL/L — SIGNIFICANT CHANGE UP (ref 135–145)
WBC # BLD: 4.57 K/UL — SIGNIFICANT CHANGE UP (ref 3.8–10.5)
WBC # FLD AUTO: 4.57 K/UL — SIGNIFICANT CHANGE UP (ref 3.8–10.5)

## 2022-02-17 NOTE — H&P PST ADULT - NEGATIVE CARDIOVASCULAR SYMPTOMS
no chest pain/no palpitations/no dyspnea on exertion/no orthopnea/no paroxysmal nocturnal dyspnea/no peripheral edema/no claudication In PST/no chest pain/no palpitations/no orthopnea/no paroxysmal nocturnal dyspnea/no peripheral edema/no claudication

## 2022-02-17 NOTE — H&P PST ADULT - SYMPTOMS
none pt denies cp, denies palpitations , pt reports sob climbing 1 flight stairs . Pt c/o Palpitations 12/21 ; pt denies further c/o palpitations. Pt to cardiologist 2 weeks ago ; pt s/p st/echo Pt reports h/o sob after climbing 1 flight stairs ; pt reports recent cardiac evaluation ; s/p ST/ Echo 2/22. In pst ; pt denies cp, denies palpitations, denies sob/none Pt reports h/o sob after climbing 1 flight stairs ; pt reports recent cardiac evaluation ; s/p EKG, ST/ Echo 2/22. In pst ; pt denies cp, denies palpitations, denies sob/none

## 2022-02-17 NOTE — H&P PST ADULT - NSANTHOSAYNRD_GEN_A_CORE
No. TAMAR screening performed.  STOP BANG Legend: 0-2 = LOW Risk; 3-4 = INTERMEDIATE Risk; 5-8 = HIGH Risk

## 2022-02-17 NOTE — H&P PST ADULT - BRAND OF FIRST COVID-19 BOOSTER
Pfizer

## 2022-02-17 NOTE — H&P PST ADULT - HISTORY OF PRESENT ILLNESS
Pt is a 51 y.o. female ; pt reports h/o        Pt is a 51 y.o. female ; pt reports h/o mass posterior neck x 5 years ; pt s/p MRI ; t to surgeon ; pt now presents for Excision Posterior Neck Mass        Pt is a 51 y.o. female ; pt reports h/o mass posterior neck x 5 years ; pt s/p MRI ; pt  to surgeon ; pt now presents for Excision Posterior Neck Mass

## 2022-02-17 NOTE — H&P PST ADULT - NSICDXPASTSURGICALHX_GEN_ALL_CORE_FT
PAST SURGICAL HISTORY:  Ileostomy status partial closure 3/2014 Complete Closure    Rectal abscess I&D 1985    S/P ileostomy 2/11/13     PAST SURGICAL HISTORY:  Ileostomy status partial closure 3/2014    Complete Reversal 5/06/14    Rectal abscess I&D 1985    S/P ileostomy 2/11/13

## 2022-02-17 NOTE — H&P PST ADULT - BACK EXAM
normal shape/ROM intact/strength intact soft mobile mass upper back noted/normal shape/strength intact

## 2022-02-17 NOTE — H&P PST ADULT - NSICDXPASTMEDICALHX_GEN_ALL_CORE_FT
PAST MEDICAL HISTORY:  Anxiety and depression     Crohn disease     Glaucoma     Ileostomy status      PAST MEDICAL HISTORY:  Anxiety and depression     Borderline diabetes pt reports prior history ; pt unsure at present    Crohn disease     Glaucoma     Ileostomy status     Tooth infection 1/22 s/p Root canal ; pt reports tx abx ; pt states " resolved "     PAST MEDICAL HISTORY:  Anxiety and depression     Borderline diabetes pt reports prior history ; pt unsure at present    Crohn disease     Glaucoma     Ileostomy status s/p Reversal    Tooth infection 1/22 s/p Root canal ; pt reports tx abx ; pt states " resolved "

## 2022-02-17 NOTE — H&P PST ADULT - GASTROINTESTINAL COMMENTS
has an ileostomy right abdomen with ileostomy to drainage bag- brown soft stool, lower mid abdominal incision healing, no signs of infection BM up to 3 x daily

## 2022-02-17 NOTE — H&P PST ADULT - PROBLEM SELECTOR PLAN 1
Excision Right Posterior Neck Mass    Pre op instructions including Pepcid & Hibiclens reviewed with pt ; pt verbalized good understanding of pre op instructions    Pt with h/o SOB on exertion ; climbing 1 flight stairs . Pt to Dr Brown for cardiac clearance , pt s/p ST/Echo 2/22    Cup provided for [facility] dos Excision Posterior Neck Mass    Pre op instructions including Pepcid & Hibiclens reviewed with pt ; pt verbalized good understanding of pre op instructions    Pt with h/o SOB on exertion ; climbing 1 flight stairs . Pt to Dr Brown for cardiac clearance , pt s/p ST/Echo 2/22    Cup provided for UCG dos Excision Posterior Neck Mass    Pre op instructions including Pepcid & Hibiclens reviewed with pt ; pt verbalized good understanding of pre op instructions    Pt with h/o SOB on exertion ; climbing 1 flight stairs . Pt to Dr Brown for cardiac clearance , pt s/p ST/Echo 2/22  IN PST ; pt denies cp, denies palpitations, denies sob    Cup provided for UCG dos

## 2022-02-17 NOTE — H&P PST ADULT - ACTIVITY
able to climb a flight of stairs pt denies routine exercise; pt is active ;  able to climb a flight of stairs

## 2022-03-10 ENCOUNTER — TRANSCRIPTION ENCOUNTER (OUTPATIENT)
Age: 52
End: 2022-03-10

## 2022-03-10 VITALS
HEART RATE: 108 BPM | OXYGEN SATURATION: 100 % | RESPIRATION RATE: 20 BRPM | TEMPERATURE: 98 F | SYSTOLIC BLOOD PRESSURE: 123 MMHG | DIASTOLIC BLOOD PRESSURE: 53 MMHG

## 2022-03-10 NOTE — ASU PREOPERATIVE ASSESSMENT, ADULT ([facility]ARK ONLY) - FALL HARM RISK - UNIVERSAL INTERVENTIONS
Bed in lowest position, wheels locked, appropriate side rails in place/Call bell, personal items and telephone in reach/Instruct patient to call for assistance before getting out of bed or chair/Non-slip footwear when patient is out of bed/[place] to call system/Physically safe environment - no spills, clutter or unnecessary equipment/Purposeful Proactive Rounding/Room/bathroom lighting operational, light cord in reach

## 2022-03-11 ENCOUNTER — OUTPATIENT (OUTPATIENT)
Dept: OUTPATIENT SERVICES | Facility: HOSPITAL | Age: 52
LOS: 1 days | Discharge: HOME | End: 2022-03-11
Payer: COMMERCIAL

## 2022-03-11 ENCOUNTER — APPOINTMENT (OUTPATIENT)
Dept: SURGERY | Facility: HOSPITAL | Age: 52
End: 2022-03-11

## 2022-03-11 ENCOUNTER — RESULT REVIEW (OUTPATIENT)
Age: 52
End: 2022-03-11

## 2022-03-11 VITALS
OXYGEN SATURATION: 100 % | SYSTOLIC BLOOD PRESSURE: 113 MMHG | HEART RATE: 54 BPM | DIASTOLIC BLOOD PRESSURE: 59 MMHG | RESPIRATION RATE: 18 BRPM | TEMPERATURE: 98 F

## 2022-03-11 DIAGNOSIS — Z93.2 ILEOSTOMY STATUS: Chronic | ICD-10-CM

## 2022-03-11 DIAGNOSIS — D48.5 NEOPLASM OF UNCERTAIN BEHAVIOR OF SKIN: ICD-10-CM

## 2022-03-11 DIAGNOSIS — K61.1 RECTAL ABSCESS: Chronic | ICD-10-CM

## 2022-03-11 PROCEDURE — 88304 TISSUE EXAM BY PATHOLOGIST: CPT | Mod: 26

## 2022-03-11 PROCEDURE — 21554 EXC NECK TUM DEEP 5 CM/>: CPT

## 2022-03-11 RX ORDER — TRAVOPROST 0.04 MG/ML
1 SOLUTION/ DROPS OPHTHALMIC
Qty: 0 | Refills: 0 | DISCHARGE

## 2022-03-11 RX ORDER — FAMOTIDINE 10 MG/ML
1 INJECTION INTRAVENOUS
Qty: 0 | Refills: 0 | DISCHARGE

## 2022-03-11 RX ORDER — ACETAMINOPHEN 500 MG
650 TABLET ORAL EVERY 6 HOURS
Refills: 0 | Status: DISCONTINUED | OUTPATIENT
Start: 2022-03-11 | End: 2022-03-25

## 2022-03-11 RX ORDER — BRIMONIDINE TARTRATE, TIMOLOL MALEATE 2; 5 MG/ML; MG/ML
1 SOLUTION/ DROPS OPHTHALMIC
Qty: 0 | Refills: 0 | DISCHARGE

## 2022-03-11 RX ORDER — ACETAMINOPHEN 500 MG
1 TABLET ORAL
Qty: 0 | Refills: 0 | DISCHARGE

## 2022-03-11 RX ORDER — SODIUM CHLORIDE 9 MG/ML
1000 INJECTION, SOLUTION INTRAVENOUS
Refills: 0 | Status: DISCONTINUED | OUTPATIENT
Start: 2022-03-11 | End: 2022-03-25

## 2022-03-11 RX ORDER — ACETAMINOPHEN 500 MG
2 TABLET ORAL
Qty: 0 | Refills: 0 | DISCHARGE
Start: 2022-03-11

## 2022-03-11 NOTE — BRIEF OPERATIVE NOTE - NSICDXBRIEFPROCEDURE_GEN_ALL_CORE_FT
PROCEDURES:  Excision of posterior neck subcutaneous tissue and fascia, open approach, diagnostic 11-Mar-2022 08:24:15  Diaz Ash

## 2022-03-11 NOTE — ASU DISCHARGE PLAN (ADULT/PEDIATRIC) - CARE PROVIDER_API CALL
Royal Andersen)  Plastic Surgery  [address] [place] [address] [address]  [place] [address]  Phone: [phone]  Fax: [phone]  Established Patient  Follow Up Time:

## 2022-03-11 NOTE — ASU PREOP CHECKLIST - TYPE OF SOLUTION
Patient is an alcoholic and has an eating disorders. Has been waiting for detox center to open. Has rule 25. Drinks beer daily because she was told by professional if she starts drinking everyday she'll get into detox center faster. Has drank 8-9 beers today.     Patient also states she can't live like this any longer and wants to die. Doesn't currently have a plan, but got tearful and stated she just can't do this anymore. Patient also states if she doesn't get help soon she's going to die.     Breathylzer 0.263  
LR @ 30cc/hr

## 2022-03-22 ENCOUNTER — APPOINTMENT (OUTPATIENT)
Dept: SURGERY | Facility: CLINIC | Age: 52
End: 2022-03-22
Payer: COMMERCIAL

## 2022-03-22 LAB — SURGICAL PATHOLOGY STUDY: SIGNIFICANT CHANGE UP

## 2022-03-22 PROCEDURE — 99024 POSTOP FOLLOW-UP VISIT: CPT

## 2022-03-22 NOTE — ED ADULT TRIAGE NOTE - IDEAL BODY WEIGHT(KG)
Pt's concern seems to relate to pain as opposed to anxiety.  She has a prescription for Tramadol.  If this has been ineffective at alleviating pain, I can prescribe a short course of Norco.   57

## 2022-03-22 NOTE — PHYSICAL EXAM
[de-identified] : well healed scar [Midline] : located in midline position [Normal] : orientation to person, place, and time: normal

## 2022-04-08 PROBLEM — R73.03 PREDIABETES: Chronic | Status: ACTIVE | Noted: 2022-02-17

## 2022-04-08 PROBLEM — K04.7 PERIAPICAL ABSCESS WITHOUT SINUS: Chronic | Status: ACTIVE | Noted: 2022-02-17

## 2022-04-12 ENCOUNTER — APPOINTMENT (OUTPATIENT)
Dept: SURGERY | Facility: CLINIC | Age: 52
End: 2022-04-12
Payer: COMMERCIAL

## 2022-04-12 PROCEDURE — 99024 POSTOP FOLLOW-UP VISIT: CPT

## 2022-04-12 NOTE — HISTORY OF PRESENT ILLNESS
[de-identified] : 1 month s/p excision right posterior neck lipoma excision. returns earlier than scheduled with operative site swelling and itching.  recent MRI neck for headache and right neck discomfort reportedly normal. I have reviewed all old and new data and available images.\par

## 2022-04-12 NOTE — PHYSICAL EXAM
[de-identified] : right posterior neck scar healing well . no evidence of infection.  slight swelling with minimal fluid collection [de-identified] : no palpable thyroid nodules [Midline] : located in midline position [Normal] : orientation to person, place, and time: normal

## 2022-04-12 NOTE — ASSESSMENT
[FreeTextEntry1] : normal postoperative change. should continue to improve spontaneously.  instructed in maneuvers to minimize scarring. to return earlier if any change. patient has been given the opportunity to ask questions, and all of the patient's questions have been answered to their satisfaction\par

## 2022-06-21 ENCOUNTER — APPOINTMENT (OUTPATIENT)
Dept: SURGERY | Facility: CLINIC | Age: 52
End: 2022-06-21
Payer: COMMERCIAL

## 2022-06-21 PROCEDURE — 99213 OFFICE O/P EST LOW 20 MIN: CPT

## 2022-06-21 NOTE — PHYSICAL EXAM
[de-identified] : right posterior neck scar healing well . no evidence of infection.  no swelling.  no new lesions noted [de-identified] : no palpable thyroid nodules [Midline] : located in midline position [Normal] : orientation to person, place, and time: normal

## 2022-06-21 NOTE — ASSESSMENT
[FreeTextEntry1] : will observe.   instructed in maneuvers to minimize scarring. to return earlier if any change. patient has been given the opportunity to ask questions, and all of the patient's questions have been answered to their satisfaction\par

## 2022-06-21 NOTE — HISTORY OF PRESENT ILLNESS
[de-identified] : 3  months s/p excision right posterior neck lipoma excision. previously noted swelling resolved. notes sensitivity of area but denies pain, drainage, infection.  no changes medically since last visit.  I have reviewed all old and new data and available images.\par

## 2022-12-29 ENCOUNTER — APPOINTMENT (OUTPATIENT)
Dept: SURGERY | Facility: CLINIC | Age: 52
End: 2022-12-29

## 2022-12-29 PROCEDURE — 99213 OFFICE O/P EST LOW 20 MIN: CPT

## 2022-12-29 NOTE — HISTORY OF PRESENT ILLNESS
[de-identified] : 9   months s/p excision right posterior neck lipoma excision. previously noted swelling resolved.  denies pain, drainage, infection.  no changes medically since last visit.  I have reviewed all old and new data and available images.  still being worked up for right head swelling\par

## 2022-12-29 NOTE — ASSESSMENT
[FreeTextEntry1] : continued f/u with PCP.  patient has been given the opportunity to ask questions, and all of the patient's questions have been answered to their satisfaction\par

## 2022-12-29 NOTE — PHYSICAL EXAM
[de-identified] : right posterior neck scar well healed . no evidence of infection.  no swelling.  no new lesions noted [de-identified] : no palpable thyroid nodules [Midline] : located in midline position [Normal] : orientation to person, place, and time: normal

## 2023-04-27 ENCOUNTER — APPOINTMENT (OUTPATIENT)
Dept: OTOLARYNGOLOGY | Facility: CLINIC | Age: 53
End: 2023-04-27
Payer: COMMERCIAL

## 2023-04-27 VITALS
WEIGHT: 210 LBS | SYSTOLIC BLOOD PRESSURE: 131 MMHG | HEIGHT: 66 IN | HEART RATE: 58 BPM | DIASTOLIC BLOOD PRESSURE: 80 MMHG | BODY MASS INDEX: 33.75 KG/M2

## 2023-04-27 DIAGNOSIS — R22.1 LOCALIZED SWELLING, MASS AND LUMP, NECK: ICD-10-CM

## 2023-04-27 PROCEDURE — 99214 OFFICE O/P EST MOD 30 MIN: CPT

## 2023-04-27 RX ORDER — AMOXICILLIN 500 MG/1
500 CAPSULE ORAL
Qty: 21 | Refills: 0 | Status: COMPLETED | COMMUNITY
Start: 2022-01-04 | End: 2023-04-27

## 2023-04-27 RX ORDER — TRAVOPROST 0.04 MG/ML
0 SOLUTION OPHTHALMIC
Qty: 5 | Refills: 0 | Status: COMPLETED | COMMUNITY
Start: 2022-06-12 | End: 2023-04-27

## 2023-04-27 RX ORDER — ROSUVASTATIN CALCIUM 5 MG/1
TABLET, FILM COATED ORAL
Refills: 0 | Status: COMPLETED | COMMUNITY
End: 2023-04-27

## 2023-04-27 RX ORDER — CICLOPIROX 80 MG/ML
8 SOLUTION TOPICAL
Qty: 7 | Refills: 0 | Status: COMPLETED | COMMUNITY
Start: 2021-12-21 | End: 2023-04-27

## 2023-04-27 NOTE — PHYSICAL EXAM
[de-identified] : Nape of neck scar [Midline] : trachea located in midline position [Normal] : no rashes [FreeTextEntry2] : Mild posterior skull asymmetry, with no discernible,  palpable mass.

## 2023-04-27 NOTE — CONSULT LETTER
[Consult Letter:] : I had the pleasure of evaluating your patient, [unfilled]. [Please see my note below.] : Please see my note below. [Consult Closing:] : Thank you very much for allowing me to participate in the care of this patient.  If you have any questions, please do not hesitate to contact me. [Sincerely,] : Sincerely, [Dear  ___] : Dear  [unfilled], [FreeTextEntry2] : Lilo Astudillo MD [FreeTextEntry3] : Rakan Bragg MD, FACS\par Chief of Otolaryngology and Head & Neck Surgery\par [facility]\par  - Dept. of Otolaryngology\par [facility]

## 2023-04-27 NOTE — DATA REVIEWED
[de-identified] : \par \par EXAM: MR NECK SOFT TISSUE ONLY [facility]\par \par \par PROCEDURE DATE: 12/01/2020\par \par \par \par INTERPRETATION: MR EXAMINATION OF THE NECK\par \par CLINICAL INDICATION: Neck mass. Possible lipoma posterior neck.\par \par TECHNIQUE: Multiplanar multisequence MR images of the neck were obtained before and after 10 ml of Gadavist IV contrast was administered. Skin markers were placed at the area of interest.\par \par COMPARISON: CT of the neck 5/9/2019.\par \par FINDINGS:\par Soft tissues: Deep to the skin marker located along the midline dorsal neck, there is subcutaneous poorly defined prominence of subcutaneous fatty tissue without clear capsulation measuring grossly 2.8 x 9.5 x 6.1 cm. There is no abnormal areas of enhancement or edema.\par \par Aerodigestive structures: Normal. No evidence of abnormal enhancement.\par \par Thyroid gland: Normal.\par Parotid and submandibular glands: Normal.\par \par Lymph nodes: There is asymmetric prominence of the left supraclavicular lymph nodes, measuring up to 2.0 x 1.1 cm (10-41), similar when compared to comparison study from 5/9/2019.\par \par Vascular structures: Normal.\par \par Osseous structures: No destructive osseous lesion.\par \par Paranasal sinuses: Clear.\par Mastoid air cells: Clear.\par \par Partially visualized intracranial structures: Normal.\par \par Partially visualized orbits: Normal\par \par Partially visualized lung apices: Normal.\par \par \par IMPRESSION:\par \par -9.5 cm area of prominent subcutaneous poorly encapsulated fatty tissue in the dorsal neck, most compatible with lipodystrophy or fat redistribution as seen in the setting of Cushing's syndrome or other metabolic disorders. This appears slightly larger since prior study.\par \par -Asymmetric prominence of left supraclavicular fossa lymph nodes measuring up to 2.0 x 1.1 cm, grossly similar to 5/9/2019 CT neck. Correlate with routine mammography/ultrasound.\par \par \par \par \par \par \par VIJAY MONTES M.D., ATTENDING RADIOLOGIST\par This document has been electronically signed. Dec 1 2020 2:57PM\par A review of the images shows mild posterior skull asymmetry.

## 2023-04-27 NOTE — ASSESSMENT
[FreeTextEntry1] : There does not appear to be a distinct mass involving the patient's skull, scalp, or the associated subcutaneous tissues.  \par \par Observation recommended.

## 2023-04-27 NOTE — HISTORY OF PRESENT ILLNESS
[de-identified] : 52 year old female presents for follow up for lump on right side of back of head and swollen right lower jaw. States 3 years ago noticed the lump behind her head, and has had swelling on the right side of face, ear, and lower jaw  had a CT scan done with no abnormalities. States right facial, ear, and jaw swelling  and size of lump behind of her head has remained the same size,pain on palpation, and couple of weeks ago had right sided head pressure. Denies recent fevers, pain on palpation, chills, difficulty opening or closing mouth, xerostomia, tasting discharge in mouth,or sore throat.

## 2023-04-27 NOTE — REASON FOR VISIT
[Subsequent Evaluation] : a subsequent evaluation for [FreeTextEntry2] : lump on right side of back of head and swollen right lower jaw

## 2023-07-18 ENCOUNTER — EMERGENCY (EMERGENCY)
Facility: HOSPITAL | Age: 53
LOS: 1 days | Discharge: HOME | End: 2023-07-18
Admitting: EMERGENCY MEDICINE
Payer: SELF-PAY

## 2023-07-18 VITALS
TEMPERATURE: 98 F | RESPIRATION RATE: 16 BRPM | HEART RATE: 55 BPM | DIASTOLIC BLOOD PRESSURE: 68 MMHG | OXYGEN SATURATION: 100 % | SYSTOLIC BLOOD PRESSURE: 144 MMHG

## 2023-07-18 DIAGNOSIS — Z93.2 ILEOSTOMY STATUS: Chronic | ICD-10-CM

## 2023-07-18 DIAGNOSIS — K61.1 RECTAL ABSCESS: Chronic | ICD-10-CM

## 2023-07-18 PROCEDURE — 99284 EMERGENCY DEPT VISIT MOD MDM: CPT

## 2023-07-18 RX ORDER — FAMOTIDINE 10 MG/ML
20 INJECTION INTRAVENOUS ONCE
Refills: 0 | Status: COMPLETED | OUTPATIENT
Start: 2023-07-18 | End: 2023-07-18

## 2023-07-18 RX ORDER — FAMOTIDINE 10 MG/ML
1 INJECTION INTRAVENOUS
Qty: 14 | Refills: 0
Start: 2023-07-18 | End: 2023-07-24

## 2023-07-18 RX ORDER — SODIUM CHLORIDE 9 MG/ML
1000 INJECTION INTRAMUSCULAR; INTRAVENOUS; SUBCUTANEOUS ONCE
Refills: 0 | Status: COMPLETED | OUTPATIENT
Start: 2023-07-18 | End: 2023-07-18

## 2023-07-18 NOTE — ED PROVIDER NOTE - NSFOLLOWUPINSTRUCTIONS_ED_ALL_ED_FT
Rest, drink plenty of fluids.  Advance activity as tolerated.  Continue all previously prescribed medications as directed.  Follow up with your primary care physician in 48-72 hours- bring copies of your results.  Return to the ER for worsening or persistent symptoms, and/or ANY NEW OR CONCERNING SYMPTOMS. If you have issues obtaining follow up, please call: [phone] to obtain a doctor or specialist who takes your insurance in your area.  You may call [phone] to make an appointment with the internal medicine clinic.

## 2023-07-18 NOTE — ED PROVIDER NOTE - OBJECTIVE STATEMENT
51 y/o F with a hx of Crohn's presents with epigastric and chest discomfort that started this morning. She states that after taking a sip of an iced coffee from [employer] in [place], she began experiencing a "potent" taste in her mouth and burning sensation down her throat. She also complains of a burning in her chest which is dull and non-radiating. Earlier today, she was seen in UC where she was diagnosed with acid reflux. She notes that these symptoms do not feel similar to Crohn's exacerbations. Denies fever, chills, fatigue, cough, difficulty swallowing, palpitations, n/v/d, constipation, dysuria or hematuria. No recent changes in diet or sick contacts. 51 y/o F with a hx of Crohn's presents with epigastric and chest discomfort that started this morning. She states that after taking a sip of an iced coffee from [employer] in [place], she began experiencing a "potent" taste in her mouth and burning sensation down her throat. She also complains of a burning in her chest which is dull and non-radiating. Earlier today, she was seen in UC where she was diagnosed with acid reflux. She notes that these symptoms do not feel similar to Crohn's flare-ups. Denies fever, chills, fatigue, cough, difficulty swallowing, palpitations, n/v/d, constipation, dysuria or hematuria. No recent changes in diet or sick contacts.

## 2023-07-18 NOTE — ED ADULT TRIAGE NOTE - CHIEF COMPLAINT QUOTE
Pt c/o feeling a strange sensation and burning to her stomach after drinking   coffee from [employer] /[place] location  since yesterday

## 2023-07-18 NOTE — ED PROVIDER NOTE - PROGRESS NOTE DETAILS
PA ALi: Pt states that she is feeling better and would like to home, she does not have any symptoms at this time, and really wanted to test if she got poisoned from [employer]. Shared decision making was done and patient will return if symptoms return or worsen. Will send pepcid and recommend f/u with PMD or to return if symptoms worsen.

## 2023-07-18 NOTE — ED PROVIDER NOTE - CLINICAL SUMMARY MEDICAL DECISION MAKING FREE TEXT BOX
53 y/o F with a hx of Crohn's presents with epigastric and chest discomfort that started this morning. She states that after taking a sip of an iced coffee from [employer] in [place], she began experiencing a "potent" taste in her mouth and burning sensation down her throat. She also complains of a burning in her chest which is dull and non-radiating. 51 y/o F with a hx of Crohn's presents with epigastric and chest discomfort that started this morning. She states that after taking a sip of an iced coffee from [email or web address] in [place], she began experiencing a "potent" taste in her mouth and burning sensation down her throat. She also complains of a burning in her chest which is dull and non-radiating. Patient is hemodynamically stable and in no acute distress. Exam unremarkable.   Likely gastric reflux. Plan to check CBC, CMP, UA then reassess 51 y/o F with a hx of Crohn's presents with epigastric and chest discomfort that started this morning. She states that after taking a sip of an iced coffee from [employer] in [place], she began experiencing a "potent" taste in her mouth and burning sensation down her throat. She also complains of a burning in her chest which is dull and non-radiating. Patient is hemodynamically stable and in no acute distress. Exam unremarkable.   Likely gastric reflux. Plan to check CBC, CMP, UA then reassess    Pt was seen by student and supervised by me.

## 2023-07-18 NOTE — ED PROVIDER NOTE - PATIENT PORTAL LINK FT
You can access the FollowMyHealth Patient Portal offered by [facility] by registering at the following website: [email or web address]. By joining [facility]’s FollowMyHealth portal, you will also be able to view your health information using other applications (apps) compatible with our system.

## 2023-07-24 ENCOUNTER — NON-APPOINTMENT (OUTPATIENT)
Age: 53
End: 2023-07-24

## 2023-11-21 ENCOUNTER — EMERGENCY (EMERGENCY)
Facility: HOSPITAL | Age: 53
LOS: 1 days | Discharge: HOME | End: 2023-11-21
Attending: EMERGENCY MEDICINE
Payer: COMMERCIAL

## 2023-11-21 VITALS
SYSTOLIC BLOOD PRESSURE: 118 MMHG | RESPIRATION RATE: 19 BRPM | HEART RATE: 61 BPM | OXYGEN SATURATION: 100 % | DIASTOLIC BLOOD PRESSURE: 63 MMHG

## 2023-11-21 VITALS
HEIGHT: 65 IN | WEIGHT: 210.1 LBS | TEMPERATURE: 98 F | RESPIRATION RATE: 16 BRPM | SYSTOLIC BLOOD PRESSURE: 174 MMHG | OXYGEN SATURATION: 100 % | DIASTOLIC BLOOD PRESSURE: 83 MMHG | HEART RATE: 71 BPM

## 2023-11-21 DIAGNOSIS — Z93.2 ILEOSTOMY STATUS: Chronic | ICD-10-CM

## 2023-11-21 DIAGNOSIS — K61.1 RECTAL ABSCESS: Chronic | ICD-10-CM

## 2023-11-21 LAB
ALBUMIN SERPL ELPH-MCNC: 4.4 G/DL — SIGNIFICANT CHANGE UP (ref 3.3–5)
ALBUMIN SERPL ELPH-MCNC: 4.4 G/DL — SIGNIFICANT CHANGE UP (ref 3.3–5)
ALP SERPL-CCNC: 76 U/L — SIGNIFICANT CHANGE UP (ref 40–120)
ALP SERPL-CCNC: 76 U/L — SIGNIFICANT CHANGE UP (ref 40–120)
ALT FLD-CCNC: 19 U/L — SIGNIFICANT CHANGE UP (ref 10–45)
ALT FLD-CCNC: 19 U/L — SIGNIFICANT CHANGE UP (ref 10–45)
ANION GAP SERPL CALC-SCNC: 14 MMOL/L — SIGNIFICANT CHANGE UP (ref 5–17)
ANION GAP SERPL CALC-SCNC: 14 MMOL/L — SIGNIFICANT CHANGE UP (ref 5–17)
AST SERPL-CCNC: 36 U/L — SIGNIFICANT CHANGE UP (ref 10–40)
AST SERPL-CCNC: 36 U/L — SIGNIFICANT CHANGE UP (ref 10–40)
BASE EXCESS BLDV CALC-SCNC: -2.8 MMOL/L — LOW (ref -2–3)
BASE EXCESS BLDV CALC-SCNC: -2.8 MMOL/L — LOW (ref -2–3)
BASOPHILS # BLD AUTO: 0.04 K/UL — SIGNIFICANT CHANGE UP (ref 0–0.2)
BASOPHILS # BLD AUTO: 0.04 K/UL — SIGNIFICANT CHANGE UP (ref 0–0.2)
BASOPHILS NFR BLD AUTO: 0.8 % — SIGNIFICANT CHANGE UP (ref 0–2)
BASOPHILS NFR BLD AUTO: 0.8 % — SIGNIFICANT CHANGE UP (ref 0–2)
BILIRUB SERPL-MCNC: 0.7 MG/DL — SIGNIFICANT CHANGE UP (ref 0.2–1.2)
BILIRUB SERPL-MCNC: 0.7 MG/DL — SIGNIFICANT CHANGE UP (ref 0.2–1.2)
BUN SERPL-MCNC: 16 MG/DL — SIGNIFICANT CHANGE UP (ref 7–23)
BUN SERPL-MCNC: 16 MG/DL — SIGNIFICANT CHANGE UP (ref 7–23)
CA-I SERPL-SCNC: 1.22 MMOL/L — SIGNIFICANT CHANGE UP (ref 1.15–1.33)
CA-I SERPL-SCNC: 1.22 MMOL/L — SIGNIFICANT CHANGE UP (ref 1.15–1.33)
CALCIUM SERPL-MCNC: 9.5 MG/DL — SIGNIFICANT CHANGE UP (ref 8.4–10.5)
CALCIUM SERPL-MCNC: 9.5 MG/DL — SIGNIFICANT CHANGE UP (ref 8.4–10.5)
CHLORIDE BLDV-SCNC: 107 MMOL/L — SIGNIFICANT CHANGE UP (ref 96–108)
CHLORIDE BLDV-SCNC: 107 MMOL/L — SIGNIFICANT CHANGE UP (ref 96–108)
CHLORIDE SERPL-SCNC: 104 MMOL/L — SIGNIFICANT CHANGE UP (ref 96–108)
CHLORIDE SERPL-SCNC: 104 MMOL/L — SIGNIFICANT CHANGE UP (ref 96–108)
CO2 BLDV-SCNC: 23 MMOL/L — SIGNIFICANT CHANGE UP (ref 22–26)
CO2 BLDV-SCNC: 23 MMOL/L — SIGNIFICANT CHANGE UP (ref 22–26)
CO2 SERPL-SCNC: 20 MMOL/L — LOW (ref 22–31)
CO2 SERPL-SCNC: 20 MMOL/L — LOW (ref 22–31)
CREAT SERPL-MCNC: 0.7 MG/DL — SIGNIFICANT CHANGE UP (ref 0.5–1.3)
CREAT SERPL-MCNC: 0.7 MG/DL — SIGNIFICANT CHANGE UP (ref 0.5–1.3)
EGFR: 103 ML/MIN/1.73M2 — SIGNIFICANT CHANGE UP
EGFR: 103 ML/MIN/1.73M2 — SIGNIFICANT CHANGE UP
EOSINOPHIL # BLD AUTO: 0.15 K/UL — SIGNIFICANT CHANGE UP (ref 0–0.5)
EOSINOPHIL # BLD AUTO: 0.15 K/UL — SIGNIFICANT CHANGE UP (ref 0–0.5)
EOSINOPHIL NFR BLD AUTO: 3 % — SIGNIFICANT CHANGE UP (ref 0–6)
EOSINOPHIL NFR BLD AUTO: 3 % — SIGNIFICANT CHANGE UP (ref 0–6)
GAS PNL BLDV: 137 MMOL/L — SIGNIFICANT CHANGE UP (ref 136–145)
GAS PNL BLDV: 137 MMOL/L — SIGNIFICANT CHANGE UP (ref 136–145)
GAS PNL BLDV: SIGNIFICANT CHANGE UP
GLUCOSE BLDV-MCNC: 85 MG/DL — SIGNIFICANT CHANGE UP (ref 70–99)
GLUCOSE BLDV-MCNC: 85 MG/DL — SIGNIFICANT CHANGE UP (ref 70–99)
GLUCOSE SERPL-MCNC: 97 MG/DL — SIGNIFICANT CHANGE UP (ref 70–99)
GLUCOSE SERPL-MCNC: 97 MG/DL — SIGNIFICANT CHANGE UP (ref 70–99)
HCO3 BLDV-SCNC: 22 MMOL/L — SIGNIFICANT CHANGE UP (ref 22–29)
HCO3 BLDV-SCNC: 22 MMOL/L — SIGNIFICANT CHANGE UP (ref 22–29)
HCT VFR BLD CALC: 37.4 % — SIGNIFICANT CHANGE UP (ref 34.5–45)
HCT VFR BLD CALC: 37.4 % — SIGNIFICANT CHANGE UP (ref 34.5–45)
HCT VFR BLDA CALC: 36 % — SIGNIFICANT CHANGE UP (ref 34.5–46.5)
HCT VFR BLDA CALC: 36 % — SIGNIFICANT CHANGE UP (ref 34.5–46.5)
HGB BLD CALC-MCNC: 12 G/DL — SIGNIFICANT CHANGE UP (ref 11.7–16.1)
HGB BLD CALC-MCNC: 12 G/DL — SIGNIFICANT CHANGE UP (ref 11.7–16.1)
HGB BLD-MCNC: 12.1 G/DL — SIGNIFICANT CHANGE UP (ref 11.5–15.5)
HGB BLD-MCNC: 12.1 G/DL — SIGNIFICANT CHANGE UP (ref 11.5–15.5)
IMM GRANULOCYTES NFR BLD AUTO: 0.4 % — SIGNIFICANT CHANGE UP (ref 0–0.9)
IMM GRANULOCYTES NFR BLD AUTO: 0.4 % — SIGNIFICANT CHANGE UP (ref 0–0.9)
LACTATE BLDV-MCNC: 1.2 MMOL/L — SIGNIFICANT CHANGE UP (ref 0.5–2)
LACTATE BLDV-MCNC: 1.2 MMOL/L — SIGNIFICANT CHANGE UP (ref 0.5–2)
LYMPHOCYTES # BLD AUTO: 1.69 K/UL — SIGNIFICANT CHANGE UP (ref 1–3.3)
LYMPHOCYTES # BLD AUTO: 1.69 K/UL — SIGNIFICANT CHANGE UP (ref 1–3.3)
LYMPHOCYTES # BLD AUTO: 34.3 % — SIGNIFICANT CHANGE UP (ref 13–44)
LYMPHOCYTES # BLD AUTO: 34.3 % — SIGNIFICANT CHANGE UP (ref 13–44)
MCHC RBC-ENTMCNC: 28.7 PG — SIGNIFICANT CHANGE UP (ref 27–34)
MCHC RBC-ENTMCNC: 28.7 PG — SIGNIFICANT CHANGE UP (ref 27–34)
MCHC RBC-ENTMCNC: 32.4 GM/DL — SIGNIFICANT CHANGE UP (ref 32–36)
MCHC RBC-ENTMCNC: 32.4 GM/DL — SIGNIFICANT CHANGE UP (ref 32–36)
MCV RBC AUTO: 88.6 FL — SIGNIFICANT CHANGE UP (ref 80–100)
MCV RBC AUTO: 88.6 FL — SIGNIFICANT CHANGE UP (ref 80–100)
MONOCYTES # BLD AUTO: 0.58 K/UL — SIGNIFICANT CHANGE UP (ref 0–0.9)
MONOCYTES # BLD AUTO: 0.58 K/UL — SIGNIFICANT CHANGE UP (ref 0–0.9)
MONOCYTES NFR BLD AUTO: 11.8 % — SIGNIFICANT CHANGE UP (ref 2–14)
MONOCYTES NFR BLD AUTO: 11.8 % — SIGNIFICANT CHANGE UP (ref 2–14)
NEUTROPHILS # BLD AUTO: 2.44 K/UL — SIGNIFICANT CHANGE UP (ref 1.8–7.4)
NEUTROPHILS # BLD AUTO: 2.44 K/UL — SIGNIFICANT CHANGE UP (ref 1.8–7.4)
NEUTROPHILS NFR BLD AUTO: 49.7 % — SIGNIFICANT CHANGE UP (ref 43–77)
NEUTROPHILS NFR BLD AUTO: 49.7 % — SIGNIFICANT CHANGE UP (ref 43–77)
NRBC # BLD: 0 /100 WBCS — SIGNIFICANT CHANGE UP (ref 0–0)
NRBC # BLD: 0 /100 WBCS — SIGNIFICANT CHANGE UP (ref 0–0)
PCO2 BLDV: 37 MMHG — LOW (ref 39–42)
PCO2 BLDV: 37 MMHG — LOW (ref 39–42)
PH BLDV: 7.38 — SIGNIFICANT CHANGE UP (ref 7.32–7.43)
PH BLDV: 7.38 — SIGNIFICANT CHANGE UP (ref 7.32–7.43)
PLATELET # BLD AUTO: 107 K/UL — LOW (ref 150–400)
PLATELET # BLD AUTO: 107 K/UL — LOW (ref 150–400)
PO2 BLDV: 71 MMHG — HIGH (ref 25–45)
PO2 BLDV: 71 MMHG — HIGH (ref 25–45)
POTASSIUM BLDV-SCNC: 3.8 MMOL/L — SIGNIFICANT CHANGE UP (ref 3.5–5.1)
POTASSIUM BLDV-SCNC: 3.8 MMOL/L — SIGNIFICANT CHANGE UP (ref 3.5–5.1)
POTASSIUM SERPL-MCNC: 5 MMOL/L — SIGNIFICANT CHANGE UP (ref 3.5–5.3)
POTASSIUM SERPL-MCNC: 5 MMOL/L — SIGNIFICANT CHANGE UP (ref 3.5–5.3)
POTASSIUM SERPL-SCNC: 5 MMOL/L — SIGNIFICANT CHANGE UP (ref 3.5–5.3)
POTASSIUM SERPL-SCNC: 5 MMOL/L — SIGNIFICANT CHANGE UP (ref 3.5–5.3)
PROT SERPL-MCNC: 7.9 G/DL — SIGNIFICANT CHANGE UP (ref 6–8.3)
PROT SERPL-MCNC: 7.9 G/DL — SIGNIFICANT CHANGE UP (ref 6–8.3)
RBC # BLD: 4.22 M/UL — SIGNIFICANT CHANGE UP (ref 3.8–5.2)
RBC # BLD: 4.22 M/UL — SIGNIFICANT CHANGE UP (ref 3.8–5.2)
RBC # FLD: 14.2 % — SIGNIFICANT CHANGE UP (ref 10.3–14.5)
RBC # FLD: 14.2 % — SIGNIFICANT CHANGE UP (ref 10.3–14.5)
SAO2 % BLDV: 95.8 % — HIGH (ref 67–88)
SAO2 % BLDV: 95.8 % — HIGH (ref 67–88)
SODIUM SERPL-SCNC: 138 MMOL/L — SIGNIFICANT CHANGE UP (ref 135–145)
SODIUM SERPL-SCNC: 138 MMOL/L — SIGNIFICANT CHANGE UP (ref 135–145)
TROPONIN T, HIGH SENSITIVITY RESULT: 6 NG/L — SIGNIFICANT CHANGE UP (ref 0–51)
TROPONIN T, HIGH SENSITIVITY RESULT: 6 NG/L — SIGNIFICANT CHANGE UP (ref 0–51)
TROPONIN T, HIGH SENSITIVITY RESULT: 7 NG/L — SIGNIFICANT CHANGE UP (ref 0–51)
TROPONIN T, HIGH SENSITIVITY RESULT: 7 NG/L — SIGNIFICANT CHANGE UP (ref 0–51)
WBC # BLD: 4.92 K/UL — SIGNIFICANT CHANGE UP (ref 3.8–10.5)
WBC # BLD: 4.92 K/UL — SIGNIFICANT CHANGE UP (ref 3.8–10.5)
WBC # FLD AUTO: 4.92 K/UL — SIGNIFICANT CHANGE UP (ref 3.8–10.5)
WBC # FLD AUTO: 4.92 K/UL — SIGNIFICANT CHANGE UP (ref 3.8–10.5)

## 2023-11-21 PROCEDURE — 84484 ASSAY OF TROPONIN QUANT: CPT

## 2023-11-21 PROCEDURE — 82435 ASSAY OF BLOOD CHLORIDE: CPT

## 2023-11-21 PROCEDURE — 93005 ELECTROCARDIOGRAM TRACING: CPT | Mod: 76

## 2023-11-21 PROCEDURE — 85018 HEMOGLOBIN: CPT

## 2023-11-21 PROCEDURE — 99285 EMERGENCY DEPT VISIT HI MDM: CPT

## 2023-11-21 PROCEDURE — 82803 BLOOD GASES ANY COMBINATION: CPT

## 2023-11-21 PROCEDURE — 99285 EMERGENCY DEPT VISIT HI MDM: CPT | Mod: 25

## 2023-11-21 PROCEDURE — 71046 X-RAY EXAM CHEST 2 VIEWS: CPT

## 2023-11-21 PROCEDURE — 84295 ASSAY OF SERUM SODIUM: CPT

## 2023-11-21 PROCEDURE — 80053 COMPREHEN METABOLIC PANEL: CPT

## 2023-11-21 PROCEDURE — 85014 HEMATOCRIT: CPT

## 2023-11-21 PROCEDURE — 83605 ASSAY OF LACTIC ACID: CPT

## 2023-11-21 PROCEDURE — 82330 ASSAY OF CALCIUM: CPT

## 2023-11-21 PROCEDURE — 84132 ASSAY OF SERUM POTASSIUM: CPT

## 2023-11-21 PROCEDURE — 85025 COMPLETE CBC W/AUTO DIFF WBC: CPT

## 2023-11-21 PROCEDURE — 71046 X-RAY EXAM CHEST 2 VIEWS: CPT | Mod: 26

## 2023-11-21 PROCEDURE — 82947 ASSAY GLUCOSE BLOOD QUANT: CPT

## 2023-11-21 PROCEDURE — 36415 COLL VENOUS BLD VENIPUNCTURE: CPT

## 2023-11-21 RX ORDER — ASPIRIN/CALCIUM CARB/MAGNESIUM 324 MG
162 TABLET ORAL ONCE
Refills: 0 | Status: COMPLETED | OUTPATIENT
Start: 2023-11-21 | End: 2023-11-21

## 2023-11-21 RX ORDER — ASPIRIN/CALCIUM CARB/MAGNESIUM 324 MG
162 TABLET ORAL ONCE
Refills: 0 | Status: DISCONTINUED | OUTPATIENT
Start: 2023-11-21 | End: 2023-11-21

## 2023-11-21 RX ADMIN — Medication 162 MILLIGRAM(S): at 17:02

## 2023-11-21 NOTE — ED ADULT NURSE NOTE - OBJECTIVE STATEMENT
51 yo f arrived to the ed c/o cp since Friday intermittent; reports yesterday increase in pain also c/o l arm pain; hx of crohns disease; reports seeing cardiologist 1.5 years ago with normal results as per pt; denies SOB, HA, N/V/D, abdominal pain, fever/chills, urinary symptoms, hematuria, weakness, dizziness, numbness, tinging. Peripheral pulses present b/l. Skin warm, dry and pink. Pt placed in position of comfort. Pt educated on call bell system and provided call bell. Bed in lowest position, wheels locked, appropriate side rails raised. Pt denies needs at this time.

## 2023-11-21 NOTE — ED ADULT NURSE NOTE - NSFALLRISKASMTTYPE_ED_ALL_ED
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 Baseline documentation done today. Discussed diagnosis with patient, will continue to monitor. Patient to call with any changes. Initial (On Arrival)

## 2023-11-21 NOTE — ED PROVIDER NOTE - NSICDXPASTSURGICALHX_GEN_ALL_CORE_FT
PAST SURGICAL HISTORY:  Ileostomy status partial closure 3/2014    Complete Reversal 5/06/14    Rectal abscess I&D 1985    S/P ileostomy 2/11/13

## 2023-11-21 NOTE — ED PROVIDER NOTE - OBJECTIVE STATEMENT
52-year-old female patient past medical history Crohn's complains of squeezing left-sided chest pain radiating down left arm yesterday that lasted for 15 seconds when she was sitting at home.  Denies fevers, cough, congestion, URI symptoms, recent heavy lifting, shortness of breath, headache, dizziness, nausea, vomiting, diarrhea, abdominal pain, direct trauma.  Patient had chest pain in the past and had a stress test 1.5 years ago; normal.

## 2023-11-21 NOTE — ED PROVIDER NOTE - ATTENDING CONTRIBUTION TO CARE
Attending MD Dowell:  I personally have seen and examined this patient. I have performed a substantive portion of the visit including all aspects of the medical decision making.  Resident note reviewed and agree on plan of care and except where noted.      52-year-old woman with a history of Crohn's disease and borderline hyperlipidemia presenting for evaluation of intermittent pain under the left breast.  She states it is like a deep pain, it lasts seconds at a time, no modifying factors to the pain.  She denies any associated nausea vomiting or diaphoresis.  No pain at the time of current interview.  Patient has been taking baby aspirin for the pain, she took it yesterday.  Patient is a non-smoker.  States she had a stress test likely in 2021 and does not remember there being any abnormalities on that stress test.  Patient notes yesterday she had a twinge of pain in the left forearm but she is not sure if at the same time she was having the left chest pain.    Patient's vital signs are notable for elevated blood pressure 170 systolic otherwise nonactionable.  Patient sitting up in the stretcher in no apparent distress.  Clear lungs anteriorly regular heart sounds.  No appreciable peripheral edema, peripheral pulses intact.  No calf tenderness or swelling bilaterally.    ECG reviewed and notable for anterior T wave inversions V1 to V3, compared to previous ECGs these T wave inversions are not new.  ECG is unchanged from priors.    Patient presenting for acute pain under the left breast, story is overall fairly atypical for cardiac ischemia however given age will exclude MI with cardiac biomarkers.  HEART score 3. Patient is at low risk for MACE at this HEART score and thus does not warrant any further urgent objective cardiac testing from the emergency department at this time, if cardiac biomarkers return normal.    Considered pulmonary embolism but clinical history is not consistent with this.  Considered aortic dissection but also clinically do not suspect.      *The above represents an initial assessment/impression. Please refer to progress notes for potential changes in patient clinical course*

## 2023-11-21 NOTE — ED PROVIDER NOTE - NSICDXPASTMEDICALHX_GEN_ALL_CORE_FT
PAST MEDICAL HISTORY:  Anxiety and depression     Borderline diabetes pt reports prior history ; pt unsure at present    Crohn disease     Glaucoma     Ileostomy status s/p Reversal    Tooth infection 1/22 s/p Root canal ; pt reports tx abx ; pt states " resolved "

## 2023-11-21 NOTE — ED PROVIDER NOTE - NSFOLLOWUPINSTRUCTIONS_ED_ALL_ED_FT
The results of any blood tests and imaging studies completed during your visit today were discussed and explained to you and a copy provided with your discharge instructions.    Please follow up with cardiologist within this week.    Nonspecific Chest Pain, Adult  Chest pain is an uncomfortable, tight, or painful feeling in the chest. The pain can feel like a crushing, aching, or squeezing pressure. A person can feel a burning or tingling sensation. Chest pain can also be felt in your back, neck, jaw, shoulder, or arm. This pain can be worse when you move, sneeze, or take a deep breath.    Chest pain can be caused by a condition that is life-threatening. This must be treated right away. It can also be caused by something that is not life-threatening. If you have chest pain, it can be hard to know the difference, so it is important to get help right away to make sure that you do not have a serious condition.    Some life-threatening causes of chest pain include:  Heart attack.  A tear in the body's main blood vessel (aortic dissection).  Inflammation around your heart (pericarditis).  A problem in the lungs, such as a blood clot (pulmonary embolism) or a collapsed lung (pneumothorax).  Some non life-threatening causes of chest pain include:  Heartburn.  Anxiety or stress.  Damage to the bones, muscles, and cartilage that make up your chest wall.  Pneumonia or bronchitis.  Shingles infection (varicella-zoster virus).  Your chest pain may come and go. It may also be constant. Your health care provider will do tests and other studies to find the cause of your pain. Treatment will depend on the cause of your chest pain.    Follow these instructions at home:  Medicines    Take over-the-counter and prescription medicines only as told by your health care provider.  If you were prescribed an antibiotic medicine, take it as told by your health care provider. Do not stop taking the antibiotic even if you start to feel better.  Activity    Avoid any activities that cause chest pain.  Do not lift anything that is heavier than 10 lb (4.5 kg), or the limit that you are told, until your health care provider says that it is safe.  Rest as directed by your health care provider.  Return to your normal activities only as told by your health care provider. Ask your health care provider what activities are safe for you.  Lifestyle    A plate along with examples of foods in a healthy diet.  Silhouette of a person sitting on the floor doing yoga.  Do not use any products that contain nicotine or tobacco, such as cigarettes, e-cigarettes, and chewing tobacco. If you need help quitting, ask your health care provider.  Do not drink alcohol.  Make healthy lifestyle changes as recommended. These may include:  Getting regular exercise. Ask your health care provider to suggest some exercises that are safe for you.  Eating a heart-healthy diet. This includes plenty of fresh fruits and vegetables, whole grains, low-fat (lean) protein, and low-fat dairy products. A dietitian can help you find healthy eating options.  Maintaining a healthy weight.  Managing any other health conditions you may have, such as high blood pressure (hypertension) or diabetes.  Reducing stress, such as with yoga or relaxation techniques.  General instructions    Pay attention to any changes in your symptoms.  It is up to you to get the results of any tests that were done. Ask your health care provider, or the department that is doing the tests, when your results will be ready.  Keep all follow-up visits as told by your health care provider. This is important.  You may be asked to go for further testing if your chest pain does not go away.  Contact a health care provider if:  Your chest pain does not go away.  You feel depressed.  You have a fever.  You notice changes in your symptoms or develop new symptoms.  Get help right away if:  Your chest pain gets worse.  You have a cough that gets worse, or you cough up blood.  You have severe pain in your abdomen.  You faint.  You have sudden, unexplained chest discomfort.  You have sudden, unexplained discomfort in your arms, back, neck, or jaw.  You have shortness of breath at any time.  You suddenly start to sweat, or your skin gets clammy.  You feel nausea or you vomit.  You suddenly feel lightheaded or dizzy.  You have severe weakness, or unexplained weakness or fatigue.  Your heart begins to beat quickly, or it feels like it is skipping beats.  These symptoms may represent a serious problem that is an emergency. Do not wait to see if the symptoms will go away. Get medical help right away. Call your local emergency services (911 in the [place] Do not drive yourself to the hospital.    Summary  Chest pain can be caused by a condition that is serious and requires urgent treatment. It may also be caused by something that is not life-threatening.  Your health care provider may do lab tests and other studies to find the cause of your pain.  Follow your health care provider's instructions on taking medicines, making lifestyle changes, and getting emergency treatment if symptoms become worse.  Keep all follow-up visits as told by your health care provider. This includes visits for any further testing if your chest pain does not go away.  This information is not intended to replace advice given to you by your health care provider. Make sure you discuss any questions you have with your health care provider.

## 2023-11-21 NOTE — ED PROVIDER NOTE - PATIENT PORTAL LINK FT
You can access the FollowMyHealth Patient Portal offered by [facility] by registering at the following website: [email or web address]. By joining [employer]’s FollowMyHealth portal, you will also be able to view your health information using other applications (apps) compatible with our system.

## 2023-11-21 NOTE — ED PROVIDER NOTE - PHYSICAL EXAMINATION
GENERAL: NAD  HEENT:  Atraumatic  CHEST/LUNG: Chest rise equal bilaterally, clear breath sounds b/l. Pain reproducible on palpation to left anterior rib (approx rib 7-9)  HEART: Regular rate and rhythm  ABDOMEN: Soft, Nontender, Nondistended  EXTREMITIES:  Extremities warm  PSYCH: A&Ox3  SKIN: No obvious rashes or lesions  MSK: No cervical spine TTP, able to range neck to the left and right  NEUROLOGY: strength and sensation intact in all extremities. CN 2 - 12 intact. Ambulatory w/o difficulty

## 2024-02-01 ENCOUNTER — EMERGENCY (EMERGENCY)
Facility: HOSPITAL | Age: 54
LOS: 1 days | Discharge: HOME | End: 2024-02-01
Attending: EMERGENCY MEDICINE
Payer: COMMERCIAL

## 2024-02-01 VITALS
DIASTOLIC BLOOD PRESSURE: 80 MMHG | OXYGEN SATURATION: 100 % | RESPIRATION RATE: 17 BRPM | HEART RATE: 67 BPM | SYSTOLIC BLOOD PRESSURE: 126 MMHG

## 2024-02-01 VITALS
RESPIRATION RATE: 18 BRPM | DIASTOLIC BLOOD PRESSURE: 71 MMHG | TEMPERATURE: 98 F | WEIGHT: 214.95 LBS | HEIGHT: 65 IN | OXYGEN SATURATION: 100 % | SYSTOLIC BLOOD PRESSURE: 100 MMHG | HEART RATE: 68 BPM

## 2024-02-01 DIAGNOSIS — Z93.2 ILEOSTOMY STATUS: Chronic | ICD-10-CM

## 2024-02-01 DIAGNOSIS — K61.1 RECTAL ABSCESS: Chronic | ICD-10-CM

## 2024-02-01 PROCEDURE — 99283 EMERGENCY DEPT VISIT LOW MDM: CPT

## 2024-02-01 PROCEDURE — 73110 X-RAY EXAM OF WRIST: CPT

## 2024-02-01 PROCEDURE — 73110 X-RAY EXAM OF WRIST: CPT | Mod: 26,RT

## 2024-02-01 PROCEDURE — 99283 EMERGENCY DEPT VISIT LOW MDM: CPT | Mod: 25

## 2024-02-01 NOTE — ED PROVIDER NOTE - OBJECTIVE STATEMENT
52 y/o female, here for right wrist injury. states she banged on a metal gate and hurt her right wrist. states she did not fall or hit her head. states able to range her wrist but it hurts. denies numbness, swelling, f/n/v/d, CP, SOB, HA, dizziness.

## 2024-02-01 NOTE — ED ADULT NURSE NOTE - OBJECTIVE STATEMENT
54 yo female presents to ED c/o R wrist pain x 2 days. Pt reports she was trying to bang a metal plate back into gate. Pt denies numbness/tingling, laceration/wound, swelling to wrist, fall. Pt endorsing ROM in wrist with some discomfort upon palpation. Pt a&ox3, breathing spontaneous and unlabored, moving all extremities and follow commands, skin warm dry and appropriate color, sensation intact, +pulses. Pt safety measures in place and comfort provided.

## 2024-02-01 NOTE — ED PROVIDER NOTE - PHYSICAL EXAMINATION
MSK: +ttp to right wrist. full ROM present. no edema, erythema, or warmth present. no laceration or abrasion present.

## 2024-02-01 NOTE — ED PROVIDER NOTE - NSFOLLOWUPCLINICS_GEN_ALL_ED_FT
[facility] Orthopedic Surgery  Orthopedic Surgery  [address], [address] & [address]th [place] [address]  Phone: [phone]  Fax:   Follow Up Time: 1-3 Days

## 2024-02-01 NOTE — ED ADULT NURSE NOTE - NSFALLUNIVINTERV_ED_ALL_ED
Bed/Stretcher in lowest position, wheels locked, appropriate side rails in place/Call bell, personal items and telephone in reach/Instruct patient to call for assistance before getting out of bed/chair/stretcher/Non-slip footwear applied when patient is off stretcher/Angelica to call system/Physically safe environment - no spills, clutter or unnecessary equipment/Purposeful proactive rounding/Room/bathroom lighting operational, light cord in reach

## 2024-02-01 NOTE — ED PROVIDER NOTE - NSFOLLOWUPINSTRUCTIONS_ED_ALL_ED_FT
1. It is important to follow up with your primary care doctor in 1-2 days    follow up with orthopedics in 1-2 days     2. bring a copy of all your results to your follow up appointments    3. you can take Tylenol as needed for pain. you can take 650mg of Tylenol every 6 hours as needed for pain. do not take more than 4000mg in a 24 hour period.     4. if your symptoms worsen, persist, or if any new symptoms develop, or if you experience any signs of distress, return to the ER right away.

## 2024-02-01 NOTE — ED PROVIDER NOTE - ATTENDING APP SHARED VISIT CONTRIBUTION OF CARE
52 y/o female, here for right wrist injury After banging it on a loose metal door several days ago had a colonoscopy yesterday with no issues related to that but still having pain to the wrist no obvious deformities no swelling no tenderness on exam full range of motion x-ray ordered likely sprain.

## 2024-02-01 NOTE — ED PROVIDER NOTE - PATIENT PORTAL LINK FT
You can access the FollowMyHealth Patient Portal offered by [facility] by registering at the following website: [email or web address]. By joining [employer]’s FollowMyHealth portal, you will also be able to view your health information using other applications (apps) compatible with our system.

## 2024-02-08 ENCOUNTER — EMERGENCY (EMERGENCY)
Facility: HOSPITAL | Age: 54
LOS: 1 days | Discharge: HOME | End: 2024-02-08
Attending: EMERGENCY MEDICINE
Payer: SELF-PAY

## 2024-02-08 VITALS
TEMPERATURE: 98 F | DIASTOLIC BLOOD PRESSURE: 85 MMHG | RESPIRATION RATE: 20 BRPM | SYSTOLIC BLOOD PRESSURE: 153 MMHG | WEIGHT: 214.95 LBS | HEIGHT: 65 IN | HEART RATE: 82 BPM | OXYGEN SATURATION: 100 %

## 2024-02-08 DIAGNOSIS — Z93.2 ILEOSTOMY STATUS: Chronic | ICD-10-CM

## 2024-02-08 DIAGNOSIS — T17.208A UNSPECIFIED FOREIGN BODY IN PHARYNX CAUSING OTHER INJURY, INITIAL ENCOUNTER: ICD-10-CM

## 2024-02-08 DIAGNOSIS — K61.1 RECTAL ABSCESS: Chronic | ICD-10-CM

## 2024-02-08 LAB
APTT BLD: 28.2 SEC — SIGNIFICANT CHANGE UP (ref 24.5–35.6)
BASOPHILS # BLD AUTO: 0.04 K/UL — SIGNIFICANT CHANGE UP (ref 0–0.2)
BASOPHILS NFR BLD AUTO: 0.6 % — SIGNIFICANT CHANGE UP (ref 0–2)
BLD GP AB SCN SERPL QL: NEGATIVE — SIGNIFICANT CHANGE UP
EOSINOPHIL # BLD AUTO: 0.31 K/UL — SIGNIFICANT CHANGE UP (ref 0–0.5)
EOSINOPHIL NFR BLD AUTO: 4.6 % — SIGNIFICANT CHANGE UP (ref 0–6)
HCG SERPL-ACNC: 2.4 MIU/ML — SIGNIFICANT CHANGE UP
HCT VFR BLD CALC: 37.5 % — SIGNIFICANT CHANGE UP (ref 34.5–45)
HGB BLD-MCNC: 12.1 G/DL — SIGNIFICANT CHANGE UP (ref 11.5–15.5)
IMM GRANULOCYTES NFR BLD AUTO: 0.3 % — SIGNIFICANT CHANGE UP (ref 0–0.9)
INR BLD: 0.99 RATIO — SIGNIFICANT CHANGE UP (ref 0.85–1.18)
LYMPHOCYTES # BLD AUTO: 2.64 K/UL — SIGNIFICANT CHANGE UP (ref 1–3.3)
LYMPHOCYTES # BLD AUTO: 38.8 % — SIGNIFICANT CHANGE UP (ref 13–44)
MCHC RBC-ENTMCNC: 28.9 PG — SIGNIFICANT CHANGE UP (ref 27–34)
MCHC RBC-ENTMCNC: 32.3 GM/DL — SIGNIFICANT CHANGE UP (ref 32–36)
MCV RBC AUTO: 89.5 FL — SIGNIFICANT CHANGE UP (ref 80–100)
MONOCYTES # BLD AUTO: 0.76 K/UL — SIGNIFICANT CHANGE UP (ref 0–0.9)
MONOCYTES NFR BLD AUTO: 11.2 % — SIGNIFICANT CHANGE UP (ref 2–14)
NEUTROPHILS # BLD AUTO: 3.04 K/UL — SIGNIFICANT CHANGE UP (ref 1.8–7.4)
NEUTROPHILS NFR BLD AUTO: 44.5 % — SIGNIFICANT CHANGE UP (ref 43–77)
NRBC # BLD: 0 /100 WBCS — SIGNIFICANT CHANGE UP (ref 0–0)
PLATELET # BLD AUTO: 194 K/UL — SIGNIFICANT CHANGE UP (ref 150–400)
PROTHROM AB SERPL-ACNC: 10.4 SEC — SIGNIFICANT CHANGE UP (ref 9.5–13)
RBC # BLD: 4.19 M/UL — SIGNIFICANT CHANGE UP (ref 3.8–5.2)
RBC # FLD: 14.3 % — SIGNIFICANT CHANGE UP (ref 10.3–14.5)
RH IG SCN BLD-IMP: POSITIVE — SIGNIFICANT CHANGE UP
WBC # BLD: 6.81 K/UL — SIGNIFICANT CHANGE UP (ref 3.8–10.5)
WBC # FLD AUTO: 6.81 K/UL — SIGNIFICANT CHANGE UP (ref 3.8–10.5)

## 2024-02-08 PROCEDURE — 36415 COLL VENOUS BLD VENIPUNCTURE: CPT

## 2024-02-08 PROCEDURE — 85610 PROTHROMBIN TIME: CPT

## 2024-02-08 PROCEDURE — 80053 COMPREHEN METABOLIC PANEL: CPT

## 2024-02-08 PROCEDURE — 70490 CT SOFT TISSUE NECK W/O DYE: CPT | Mod: MA

## 2024-02-08 PROCEDURE — 84702 CHORIONIC GONADOTROPIN TEST: CPT

## 2024-02-08 PROCEDURE — 86901 BLOOD TYPING SEROLOGIC RH(D): CPT

## 2024-02-08 PROCEDURE — 70490 CT SOFT TISSUE NECK W/O DYE: CPT | Mod: 26,MA

## 2024-02-08 PROCEDURE — 86900 BLOOD TYPING SEROLOGIC ABO: CPT

## 2024-02-08 PROCEDURE — 85730 THROMBOPLASTIN TIME PARTIAL: CPT

## 2024-02-08 PROCEDURE — 86850 RBC ANTIBODY SCREEN: CPT

## 2024-02-08 PROCEDURE — 85025 COMPLETE CBC W/AUTO DIFF WBC: CPT

## 2024-02-08 PROCEDURE — 99285 EMERGENCY DEPT VISIT HI MDM: CPT

## 2024-02-08 PROCEDURE — 31505 DIAGNOSTIC LARYNGOSCOPY: CPT

## 2024-02-08 PROCEDURE — 99285 EMERGENCY DEPT VISIT HI MDM: CPT | Mod: 25

## 2024-02-08 NOTE — ED PROVIDER NOTE - NSFOLLOWUPINSTRUCTIONS_ED_ALL_ED_FT
Attached information on foreign body sensation.      Return to the emergency rooms for any new or worsening symptoms.      Follow-up with ENT in 1 week as discussed, information provided.  You will also get a phone call to help coordinate an appointment.    Globus Pharyngeus    Globus pharyngeus is a condition that makes a person feel like there is a lump in their throat. It may also feel like there is something stuck in the front of the throat. It does not cause pain. It also does not make it harder to swallow food or liquid. Your health care provider may not see any changes to your throat during an exam.    Globus pharyngeus may come and go but may last a long time when it occurs. In most cases, it does not require treatment.    It may be caused by:  Gastroesophageal reflux. This is when juices from the stomach flow back up into the throat.  Neuralgia. This is irritation of the nerves that help you swallow.  Anxiety, grief, or depression.  Irritation from cigarettes, alcohol, or drinks with caffeine.  Your health care provider will do exams and tests to look for a cause and to rule out other problems.    Follow these instructions at home:  A person practicing relaxation techniques.  Watch for any changes. To help with your condition:  Take over-the-counter and prescription medicines only as told by your health care provider.  Follow instructions from your health care provider about what you may eat and drink. You may need to reduce the amount of alcohol and caffeine that you take in.  Avoid stress. Do relaxation exercises as told by your health care provider.  Do not use any products that contain nicotine or tobacco. These products include cigarettes, chewing tobacco, and vaping devices, such as e-cigarettes. If you need help quitting, ask your health care provider.  It is up to you to get the results of any tests that were done. Ask your health care provider, or the department that is doing the tests, when your results will be ready.  Contact a health care provider if:  Your symptoms get worse.  You have throat pain.  It is hard to swallow.  Food or liquid comes back up into your mouth.  You lose weight without trying.  Get help right away if:  Your throat swells.  This information is not intended to replace advice given to you by your health care provider. Make sure you discuss any questions you have with your health care provider.

## 2024-02-08 NOTE — ED PROVIDER NOTE - CLINICAL SUMMARY MEDICAL DECISION MAKING FREE TEXT BOX
42 yo F presenting with foreign body sensation, hoarse voice, throat pain for the past hour since eating fish. 42 yo F presenting with foreign body sensation, hoarse voice, throat pain for the past hour since eating fish.  No SOB or difficulty breathing.  VSS.  Throat without foreign body visualized, mild erythema, uvula midline, hoarse  Concern for pharyngeal/esophageal foreign body vs perforation.  Will obtain labs, ct neck soft tissue, consult ent.

## 2024-02-08 NOTE — ED PROVIDER NOTE - OBJECTIVE STATEMENT
41-year-old female presenting to ED with complaints of foreign body sensation in the back of her throat, throat pain, difficulty speaking, for the past hour after eating fish for dinner.  Patient believes she swallowed a fishbone that is now stuck in the back of her throat.  Patient communicating through nods and writing out text on her cell phone.  Pain worse with attempting to speak, swallowing.  No shortness of breath or difficulty breathing.

## 2024-02-08 NOTE — CONSULT NOTE ADULT - PROBLEM SELECTOR RECOMMENDATION 9
Follow up at outpatient [facility] clinic call [phone] to make an appointment. [address], [place], [address]. no evidence of foreign body on exam  diet as tolerated  pain control  Follow up at outpatient [facility] ENT clinic call [phone] to make an appointment. [address], [place], [address].

## 2024-02-08 NOTE — ED ADULT NURSE NOTE - OBJECTIVE STATEMENT
pt is a 54yo female presenting to the ED complaining of foreign body throat. pt reports accidentally ingesting fish bone around 2030 while out to dinner, reports difficulty speaking, pain with swallowing, states "I can swallow but am scared to dislodge the bone", pt airway patent, MD Jose Cochran at bedside assessing pt. pt A&Ox3 gross neuro intact, no difficulty speaking in complete sentences, pulses x 4, ac x4, abdomen soft nontender nondistended, skin intact. pt denies chest pain, sob, ha, n/v/d, abdominal pain, f/c, urinary symptoms, hematuria

## 2024-02-08 NOTE — CONSULT NOTE ADULT - SUBJECTIVE AND OBJECTIVE BOX
CC: fish bone in throat    HPI: 41-year-old female presenting to ED with complaints of foreign body sensation in the right side of her throat, odynophagia, pain with speaking after eating fish for dinner at about 8:30pm. Patient believes she swallowed a fishbone that is now stuck. Pt states she has a lot of saliva but is able to swallow it. Denies fever, N/V, dysphagia, SOB, dyspnea, changes in voice or inability to tolerate secretions.       PAST MEDICAL & SURGICAL HISTORY:  Crohn disease      Glaucoma      Anxiety and depression      Ileostomy status  s/p Reversal      Borderline diabetes  pt reports prior history ; pt unsure at present      Tooth infection  1/22 s/p Root canal ; pt reports tx abx ; pt states " resolved "      S/P ileostomy  2/11/13      Rectal abscess  I&D 1985      Ileostomy status  partial closure 3/2014    Complete Reversal 5/06/14        Allergies    No Known Allergies    Intolerances      MEDICATIONS  (STANDING):    MEDICATIONS  (PRN):      Social History: unknown    Family history: unknown    ROS:   ENT: all negative except as noted in HPI   CV: denies palpitations  Pulm: denies SOB, cough, hemoptysis  GI: denies change in apetite, indigestion, n/v  : denies pertinent urinary symptoms, urgency  Neuro: denies numbness/tingling, loss of sensation  Psych: denies anxiety  MS: denies muscle weakness, instability  Heme: denies easy bruising or bleeding  Endo: denies heat/cold intolerance, excessive sweating  Vascular: denies LE edema    Vital Signs Last 24 Hrs  T(C): 36.7 (08 Feb 2024 22:22), Max: 36.7 (08 Feb 2024 22:22)  T(F): 98 (08 Feb 2024 22:22), Max: 98 (08 Feb 2024 22:22)  HR: 78 (08 Feb 2024 22:22) (78 - 82)  BP: 144/82 (08 Feb 2024 22:22) (144/82 - 153/85)  BP(mean): --  RR: 18 (08 Feb 2024 22:22) (18 - 20)  SpO2: 99% (08 Feb 2024 22:22) (99% - 100%)    Parameters below as of 08 Feb 2024 22:22  Patient On (Oxygen Delivery Method): room air                              12.1   6.81  )-----------( 194      ( 08 Feb 2024 22:23 )             37.5         PT/INR - ( 08 Feb 2024 22:23 )   PT: 10.4 sec;   INR: 0.99 ratio         PTT - ( 08 Feb 2024 22:23 )  PTT:28.2 sec    PHYSICAL EXAM:  Gen: NAD  Skin: No rashes, bruises, or lesions  Head: Normocephalic, Atraumatic  Face: no edema, erythema, or fluctuance. Parotid glands soft without mass  Eyes: no scleral injection  Nose: Nares bilaterally patent, no discharge  Mouth: No Stridor / Drooling / Trismus.  Mucosa moist, tongue/uvula midline, oropharynx clear  Neck: Flat, supple, no lymphadenopathy, trachea midline, no masses  Lymphatic: No lymphadenopathy  Resp: breathing easily, no stridor  CV: no peripheral edema/cyanosis  GI: nondistended   Peripheral vascular: no JVD or edema  Neuro: facial nerve intact, no facial droop        Fiberoptic Indirect laryngoscopy  Indication: foreign body sensation in throat  Patient was unable to cooperate with mirror.    Nasopharynx, oropharynx, and hypopharynx clear, no bleeding. Tongue base, posterior pharyngeal wall, vallecula, epiglottis, and subglottis appear normal. No erythema, edema, pooling of secretions, masses or lesions. Airway patent, no foreign body visualized. No glottic/supraglottic edema. True vocal cords, arytenoids, vestibular folds, ventricles, pyriform sinuses, and aryepiglottic folds appear normal bilaterally. Vocal cords mobile with good contact b/l.  CC: fish bone in throat    HPI: 41-year-old female presenting to ED with complaints of foreign body sensation in the right side of her throat, odynophagia, pain with speaking after eating fish for dinner at about 8:30pm. Patient believes she swallowed a fishbone that is now stuck. Pt states she has a lot of saliva but is able to swallow it. Denies fever, N/V, dysphagia, SOB, dyspnea, changes in voice or inability to tolerate secretions.       PAST MEDICAL & SURGICAL HISTORY:  Crohn disease      Glaucoma      Anxiety and depression      Ileostomy status  s/p Reversal      Borderline diabetes  pt reports prior history ; pt unsure at present      Tooth infection  1/22 s/p Root canal ; pt reports tx abx ; pt states " resolved "      S/P ileostomy  2/11/13      Rectal abscess  I&D 1985      Ileostomy status  partial closure 3/2014    Complete Reversal 5/06/14        Allergies    No Known Allergies    Intolerances      MEDICATIONS  (STANDING):    MEDICATIONS  (PRN):      Social History: unknown    Family history: unknown    ROS:   ENT: all negative except as noted in HPI   CV: denies palpitations  Pulm: denies SOB, cough, hemoptysis  GI: denies change in apetite, indigestion, n/v  : denies pertinent urinary symptoms, urgency  Neuro: denies numbness/tingling, loss of sensation  Psych: denies anxiety  MS: denies muscle weakness, instability  Heme: denies easy bruising or bleeding  Endo: denies heat/cold intolerance, excessive sweating  Vascular: denies LE edema    Vital Signs Last 24 Hrs  T(C): 36.7 (08 Feb 2024 22:22), Max: 36.7 (08 Feb 2024 22:22)  T(F): 98 (08 Feb 2024 22:22), Max: 98 (08 Feb 2024 22:22)  HR: 78 (08 Feb 2024 22:22) (78 - 82)  BP: 144/82 (08 Feb 2024 22:22) (144/82 - 153/85)  BP(mean): --  RR: 18 (08 Feb 2024 22:22) (18 - 20)  SpO2: 99% (08 Feb 2024 22:22) (99% - 100%)    Parameters below as of 08 Feb 2024 22:22  Patient On (Oxygen Delivery Method): room air                              12.1   6.81  )-----------( 194      ( 08 Feb 2024 22:23 )             37.5         PT/INR - ( 08 Feb 2024 22:23 )   PT: 10.4 sec;   INR: 0.99 ratio         PTT - ( 08 Feb 2024 22:23 )  PTT:28.2 sec    PHYSICAL EXAM:  Gen: NAD  Skin: No rashes, bruises, or lesions  Head: Normocephalic, Atraumatic  Face: no edema, erythema, or fluctuance. Parotid glands soft without mass  Eyes: no scleral injection  Nose: Nares bilaterally patent, no discharge  Mouth: No Stridor / Drooling / Trismus.  Mucosa moist, tongue/uvula midline, oropharynx clear, no foreign body visualized  Neck: Flat, supple, no lymphadenopathy, trachea midline, no masses  Lymphatic: No lymphadenopathy  Resp: breathing easily, no stridor  CV: no peripheral edema/cyanosis  GI: nondistended   Peripheral vascular: no JVD or edema  Neuro: facial nerve intact, no facial droop        Fiberoptic Indirect laryngoscopy  Indication: foreign body sensation in throat  Patient was unable to cooperate with mirror.    Nasopharynx, oropharynx, and hypopharynx clear, no bleeding. Tongue base, posterior pharyngeal wall, vallecula, epiglottis, and subglottis appear normal. No erythema, edema, pooling of secretions, masses or lesions. Airway patent, no foreign body visualized. No glottic/supraglottic edema. True vocal cords, arytenoids, vestibular folds, ventricles, pyriform sinuses, and aryepiglottic folds appear normal bilaterally. Vocal cords mobile with good contact b/l.       cetacaine spray CC: foreign body sensation in throat    HPI: 41-year-old female presenting to ED with complaints of foreign body sensation in the right side of her throat, odynophagia, pain with speaking after eating fish for dinner at about 8:30pm. Patient believes she swallowed a fishbone that is now stuck. Pt states she has a lot of saliva but is able to swallow it. Denies fever, N/V, dysphagia, SOB, dyspnea, changes in voice or inability to tolerate secretions.       PAST MEDICAL & SURGICAL HISTORY:  Crohn disease      Glaucoma      Anxiety and depression      Ileostomy status  s/p Reversal      Borderline diabetes  pt reports prior history ; pt unsure at present      Tooth infection  1/22 s/p Root canal ; pt reports tx abx ; pt states " resolved "      S/P ileostomy  2/11/13      Rectal abscess  I&D 1985      Ileostomy status  partial closure 3/2014    Complete Reversal 5/06/14        Allergies    No Known Allergies    Intolerances      MEDICATIONS  (STANDING):    MEDICATIONS  (PRN):      Social History: unknown    Family history: unknown    ROS:   ENT: all negative except as noted in HPI   CV: denies palpitations  Pulm: denies SOB, cough, hemoptysis  GI: denies change in apetite, indigestion, n/v  : denies pertinent urinary symptoms, urgency  Neuro: denies numbness/tingling, loss of sensation  Psych: denies anxiety  MS: denies muscle weakness, instability  Heme: denies easy bruising or bleeding  Endo: denies heat/cold intolerance, excessive sweating  Vascular: denies LE edema    Vital Signs Last 24 Hrs  T(C): 36.7 (08 Feb 2024 22:22), Max: 36.7 (08 Feb 2024 22:22)  T(F): 98 (08 Feb 2024 22:22), Max: 98 (08 Feb 2024 22:22)  HR: 78 (08 Feb 2024 22:22) (78 - 82)  BP: 144/82 (08 Feb 2024 22:22) (144/82 - 153/85)  BP(mean): --  RR: 18 (08 Feb 2024 22:22) (18 - 20)  SpO2: 99% (08 Feb 2024 22:22) (99% - 100%)    Parameters below as of 08 Feb 2024 22:22  Patient On (Oxygen Delivery Method): room air                              12.1   6.81  )-----------( 194      ( 08 Feb 2024 22:23 )             37.5         PT/INR - ( 08 Feb 2024 22:23 )   PT: 10.4 sec;   INR: 0.99 ratio         PTT - ( 08 Feb 2024 22:23 )  PTT:28.2 sec    PHYSICAL EXAM:  Gen: NAD  Skin: No rashes, bruises, or lesions  Head: Normocephalic, Atraumatic  Face: no edema, erythema, or fluctuance. Parotid glands soft without mass  Eyes: no scleral injection  Nose: Nares bilaterally patent, no discharge  Mouth: No Stridor / Drooling / Trismus.  Mucosa moist, tongue/uvula midline, oropharynx clear, no foreign body visualized  Neck: Flat, supple, no lymphadenopathy, trachea midline, no masses, nontender  Lymphatic: No lymphadenopathy  Resp: breathing easily, no stridor  CV: no peripheral edema/cyanosis  GI: nondistended   Peripheral vascular: no JVD or edema  Neuro: facial nerve intact, no facial droop        Fiberoptic Indirect laryngoscopy  Indication: foreign body sensation in throat  Patient was unable to cooperate with mirror.    no foreign body visualized. Nasopharynx, oropharynx, and hypopharynx clear, no bleeding. Tongue base, posterior pharyngeal wall, vallecula, epiglottis, and subglottis appear normal. No erythema, edema, pooling of secretions, masses or lesions. Airway patent. No glottic/supraglottic edema. True vocal cords, arytenoids, vestibular folds, ventricles, pyriform sinuses, and aryepiglottic folds appear normal bilaterally. Vocal cords mobile with good contact b/l.       < from: CT Neck Soft Tissue No Cont (02.08.24 @ 23:29) >  FINDINGS:  AERODIGESTIVE TRACT:  There is discontiguous calcification of the right,   greater than left, stylohyoid ligament which accounts for the linear   calcific density projecting laterally from the anterior right hyoid bone   adjacent to the right vallecula and goes superiorly towards the right   parapharyngeal space whereit is along a noncalcified ligamentous tract   which connects up to the more calcified projection of the right   stylohyoid ligament (series 601 image 66). No retained linear calcified   foreign body in the aerodigestive tract. No masslike asymmetry or   nodularity. Partial retropharyngeal course of the right internal carotid   artery with mass effect on the right pharyngeal wall.  LYMPH NODES:  No pathologic adenopathy. Small amount of soft tissue   density in the anterior mediastinum is unchanged and likely represents   residual thymic tissue.  PAROTID GLANDS:  Unremarkable.  SUBMANDIBULAR GLANDS:  Unremarkable.  THYROID GLAND:  Unremarkable.  VISUALIZED PARANASAL SINUSES:  Clear.  VISUALIZED TYMPANOMASTOID CAVITIES: Clear.  BONES:  Within normal limits.  MISCELLANEOUS:  The visualized intracranial and intraorbital compartments   demonstrate no acute abnormality.    IMPRESSION:  No evidence of retained radiopaque foreign body in the aerodigestive   tract.    --- End of Report ---    < end of copied text >

## 2024-02-08 NOTE — ED PROVIDER NOTE - ATTENDING CONTRIBUTION TO CARE
eating bronzino pt felt fb / bone in throat / trouble to talk but no trouble to breath or w secretions  no fb to post throat w tongue blade  used mac blade 3 dl w no vis of post structures  following dl pt had improvement to voice but still fb sense  cbc / cmp   dw ent, they scoped pt and req ct soft tiss neck  await their recs and ct results

## 2024-02-08 NOTE — ED PROCEDURE NOTE - PROCEDURE ADDITIONAL DETAILS
applied benzocaine spray to back of throat, inserted laryngoscope, unable to visualize cords, no foreign body visualized for removal

## 2024-02-08 NOTE — ED PROVIDER NOTE - PROGRESS NOTE DETAILS
Omer PGY1: applied benzocaine spray to back of throat, inserted laryngoscope, unable to visualize cords, no fish bone visualized.  mild erythema to posterior pharynx, no edema, uvula midline Omer PGY1: ENT consulted.  they will evaluate patient. Omer PGY1: applied benzocaine spray to back of throat, inserted laryngoscope, unable to visualize cords 2/2 patient tolerance, no fish bone visualized to posterior oropharynx.  mild erythema, no edema, uvula midline. Omer PGY1: ent at bedside. Signed out to me, no foreign body on CT.  Cleared by ENT for outpatient follow-up in 1 week.  Will give patient return precautions and follow-up instructions with teach back.  Plan for DC. Halina Mora, ED Attending

## 2024-02-08 NOTE — ED PROVIDER NOTE - NSFOLLOWUPCLINICS_GEN_ALL_ED_FT
[facility] - ENT  Otolaryngology (ENT)  [address] [place] [address]  Phone: [phone]  Fax:

## 2024-02-08 NOTE — ED PROVIDER NOTE - PHYSICAL EXAMINATION
CONSTITUTIONAL: Well appearing, awake, alert, and in no apparent distress.  HEAD: normocephalic, atraumatic  ENT: oral mucosa moist; mild erythema, uvula midline, no foreign body visualized  CARDIAC: regular rate and rhythm; no murmurs, rubs, or gallops  RESPIRATORY: normal breath sounds, clear to ascultation bilaterally, no rales, rhonchi, wheezing  GASTROINTESTINAL: abdomen nondistended, soft, nontender, no guarding, rebound tenderness  MSK: Spine appears normal, range of motion is not limited, no muscle or joint tenderness  NEURO: Alert and oriented, no focal deficits, no motor or sensory deficits.  SKIN: Skin normal color for race, warm, dry and intact. No evidence of rash.  PSYCH: appropriate mood and affect

## 2024-02-08 NOTE — ED PROVIDER NOTE - PATIENT PORTAL LINK FT
You can access the FollowMyHealth Patient Portal offered by [facility] by registering at the following website: [email or web address]. By joining [facility]’s FollowMyHealth portal, you will also be able to view your health information using other applications (apps) compatible with our system.

## 2024-02-08 NOTE — CONSULT NOTE ADULT - ASSESSMENT
41-year-old female presenting to ED with complaints of foreign body sensation in the right side of her throat, odynophagia, pain with speaking after eating fish for dinner at about 8:30pm. Patient believes she swallowed a fishbone that is now stuck. Pt states she has a lot of saliva but is able to swallow it. On exam,   On indirect laryngoscopy,  41-year-old female presenting to ED with complaints of foreign body sensation in the right side of her throat, odynophagia, pain with speaking after eating fish for dinner at about 8:30pm. Patient believes she swallowed a fishbone that is now stuck. Pt states she has a lot of saliva but is able to swallow it. On exam, oropharynx clear, no foreign body visualized. On indirect laryngoscopy,  41-year-old female presenting to ED with complaints of foreign body sensation in the right side of her throat, odynophagia, pain with speaking after eating fish for dinner at about 8:30pm. Patient believes she swallowed a fishbone that is now stuck. Pt states she has a lot of saliva but is able to swallow it. On exam and indirect laryngoscopy, oropharynx clear, no foreign body visualized. CT neck shows no evidence of foreign body.

## 2024-02-09 VITALS
OXYGEN SATURATION: 99 % | SYSTOLIC BLOOD PRESSURE: 113 MMHG | DIASTOLIC BLOOD PRESSURE: 73 MMHG | RESPIRATION RATE: 17 BRPM | HEART RATE: 60 BPM | TEMPERATURE: 98 F

## 2024-02-09 LAB
ALBUMIN SERPL ELPH-MCNC: 4.4 G/DL — SIGNIFICANT CHANGE UP (ref 3.3–5)
ALP SERPL-CCNC: 86 U/L — SIGNIFICANT CHANGE UP (ref 40–120)
ALT FLD-CCNC: 18 U/L — SIGNIFICANT CHANGE UP (ref 10–45)
ANION GAP SERPL CALC-SCNC: 19 MMOL/L — HIGH (ref 5–17)
AST SERPL-CCNC: 21 U/L — SIGNIFICANT CHANGE UP (ref 10–40)
BILIRUB SERPL-MCNC: 0.5 MG/DL — SIGNIFICANT CHANGE UP (ref 0.2–1.2)
BUN SERPL-MCNC: 15 MG/DL — SIGNIFICANT CHANGE UP (ref 7–23)
CALCIUM SERPL-MCNC: 9.6 MG/DL — SIGNIFICANT CHANGE UP (ref 8.4–10.5)
CHLORIDE SERPL-SCNC: 107 MMOL/L — SIGNIFICANT CHANGE UP (ref 96–108)
CO2 SERPL-SCNC: 16 MMOL/L — LOW (ref 22–31)
CREAT SERPL-MCNC: 0.85 MG/DL — SIGNIFICANT CHANGE UP (ref 0.5–1.3)
EGFR: 81 ML/MIN/1.73M2 — SIGNIFICANT CHANGE UP
GLUCOSE SERPL-MCNC: 91 MG/DL — SIGNIFICANT CHANGE UP (ref 70–99)
POTASSIUM SERPL-MCNC: 3.9 MMOL/L — SIGNIFICANT CHANGE UP (ref 3.5–5.3)
POTASSIUM SERPL-SCNC: 3.9 MMOL/L — SIGNIFICANT CHANGE UP (ref 3.5–5.3)
PROT SERPL-MCNC: 7.8 G/DL — SIGNIFICANT CHANGE UP (ref 6–8.3)
SODIUM SERPL-SCNC: 142 MMOL/L — SIGNIFICANT CHANGE UP (ref 135–145)

## 2024-03-16 ENCOUNTER — OFFICE (OUTPATIENT)
Dept: URBAN - METROPOLITAN AREA CLINIC 35 | Facility: CLINIC | Age: 54
Setting detail: OPHTHALMOLOGY
End: 2024-03-16

## 2024-03-16 DIAGNOSIS — H00.14: ICD-10-CM

## 2024-03-16 DIAGNOSIS — H40.1121: ICD-10-CM

## 2024-03-16 DIAGNOSIS — H40.1113: ICD-10-CM

## 2024-03-16 DIAGNOSIS — H15.101: ICD-10-CM

## 2024-03-16 PROCEDURE — 92012 INTRM OPH EXAM EST PATIENT: CPT | Performed by: OPHTHALMOLOGY

## 2024-05-10 ENCOUNTER — APPOINTMENT (OUTPATIENT)
Dept: ORTHOPEDIC SURGERY | Facility: CLINIC | Age: 54
End: 2024-05-10

## 2024-05-10 NOTE — ASU DISCHARGE PLAN (ADULT/PEDIATRIC) - BATHING
The groin was prepped. The site was prepped with ChloraPrep. The site was clipped. The patient was draped. No change No change/Shower only/Do not submerge in water

## 2024-05-14 ENCOUNTER — APPOINTMENT (OUTPATIENT)
Dept: ORTHOPEDIC SURGERY | Facility: CLINIC | Age: 54
End: 2024-05-14
Payer: COMMERCIAL

## 2024-05-14 VITALS — BODY MASS INDEX: 33.75 KG/M2 | HEIGHT: 66 IN | WEIGHT: 210 LBS

## 2024-05-14 DIAGNOSIS — M65.9 SYNOVITIS AND TENOSYNOVITIS, UNSPECIFIED: ICD-10-CM

## 2024-05-14 DIAGNOSIS — M25.531 PAIN IN RIGHT WRIST: ICD-10-CM

## 2024-05-14 PROCEDURE — 99203 OFFICE O/P NEW LOW 30 MIN: CPT

## 2024-05-14 PROCEDURE — 73110 X-RAY EXAM OF WRIST: CPT | Mod: RT

## 2024-05-14 RX ORDER — METHYLPREDNISOLONE 4 MG/1
4 TABLET ORAL
Qty: 1 | Refills: 0 | Status: ACTIVE | COMMUNITY
Start: 2024-05-14 | End: 1900-01-01

## 2024-05-26 ENCOUNTER — NON-APPOINTMENT (OUTPATIENT)
Age: 54
End: 2024-05-26

## 2024-05-30 ENCOUNTER — APPOINTMENT (OUTPATIENT)
Dept: ORTHOPEDIC SURGERY | Facility: CLINIC | Age: 54
End: 2024-05-30
Payer: COMMERCIAL

## 2024-05-30 VITALS
HEART RATE: 62 BPM | DIASTOLIC BLOOD PRESSURE: 72 MMHG | SYSTOLIC BLOOD PRESSURE: 115 MMHG | BODY MASS INDEX: 38.82 KG/M2 | HEIGHT: 65 IN | WEIGHT: 233 LBS

## 2024-05-30 DIAGNOSIS — S99.912A UNSPECIFIED INJURY OF LEFT ANKLE, INITIAL ENCOUNTER: ICD-10-CM

## 2024-05-30 PROCEDURE — 99203 OFFICE O/P NEW LOW 30 MIN: CPT

## 2024-05-30 NOTE — DISCUSSION/SUMMARY
[de-identified] : The patient sustained a sprain to her left ankle.  I have discussed the pathology, natural history and treatment options with her.  She is placed in a cam boot for support.  She should maintain ice and elevation to bring down swelling.  She will be reevaluated in 2 weeks.

## 2024-05-30 NOTE — PHYSICAL EXAM
[Slightly Antalgic] : slightly antalgic [LE] : Sensory: Intact in bilateral lower extremities [DP] : dorsalis pedis 2+ and symmetric bilaterally [PT] : posterior tibial 2+ and symmetric bilaterally [Normal] : Alert and in no acute distress [Poor Appearance] : well-appearing [Acute Distress] : not in acute distress [de-identified] : The patient has no respiratory distress. Mood and affect are normal. The patient is alert and oriented to person, place and time. There is no pain with motion of the knees.  There is no tenderness of either knee.  The left ankle is tender and swollen in the area of the ATFL. There is no medial tenderness, swelling or deformity. The ankle is stable. The Achilles tendon is intact and nontender. Ankle dorsiflexion is 5 and plantar flexion 15. The skin of the ankle, midfoot and forefoot are intact. The calf is soft. [de-identified] : Procedure was performed at the [facility] EXAM: ANKLE LT 3 VIEWS PROCEDURE DATE: 05/27/2024 INTERPRETATION: XR ANKLE COMPLETE 3 VIEWS LEFT  HISTORY: Arthralgia of ankle.  VIEWS: 3 IMAGES: 3  COMPARISON: Left ankle x-rays dated 7/1/2009.  FINDINGS:  OSSEOUS STRUCTURES Fractures: None.  JOINTS Joint Space(s): Maintained.  OTHER FINDINGS: There is soft tissue swelling along the lateral malleolus.  IMPRESSION: 1. No acute osseous abnormalities.  --- End of Report ---       KANWAL WISEMAN MD; Attending Radiologist This document has been electronically signed. May 27 2024 12:39PM

## 2024-05-30 NOTE — HISTORY OF PRESENT ILLNESS
[de-identified] : 53-year-old female presents for initial evaluation of left ankle injury that occurred on 5/26/24. She slipped and fell. She went to urgent care the following day and had x-rays which were negative. She complains of intermittent throbbing pain over the lateral aspect worse with weightbearing activities. She has been icing, taking Aleve and wrapping with a compression bandage with some relief. Denies paresthesias. Denies prior injuries.

## 2024-06-04 ENCOUNTER — APPOINTMENT (OUTPATIENT)
Dept: ORTHOPEDIC SURGERY | Facility: CLINIC | Age: 54
End: 2024-06-04
Payer: COMMERCIAL

## 2024-06-04 VITALS
HEART RATE: 69 BPM | BODY MASS INDEX: 38.82 KG/M2 | DIASTOLIC BLOOD PRESSURE: 78 MMHG | HEIGHT: 65 IN | WEIGHT: 233 LBS | SYSTOLIC BLOOD PRESSURE: 120 MMHG

## 2024-06-04 DIAGNOSIS — S99.912D UNSPECIFIED INJURY OF LEFT ANKLE, SUBSEQUENT ENCOUNTER: ICD-10-CM

## 2024-06-04 PROCEDURE — 99213 OFFICE O/P EST LOW 20 MIN: CPT

## 2024-06-04 PROCEDURE — 73610 X-RAY EXAM OF ANKLE: CPT | Mod: LT

## 2024-06-04 PROCEDURE — 73630 X-RAY EXAM OF FOOT: CPT | Mod: LT

## 2024-06-04 NOTE — HISTORY OF PRESENT ILLNESS
[de-identified] : The patient presents for reevaluation of left ankle sprain. She feels pain has worsened as the boot is pressing on the lateral ankle over the area of pain and swelling she has. She has been icing and elevating with some relief.

## 2024-06-04 NOTE — PHYSICAL EXAM
[Slightly Antalgic] : slightly antalgic [LE] : Sensory: Intact in bilateral lower extremities [DP] : dorsalis pedis 2+ and symmetric bilaterally [PT] : posterior tibial 2+ and symmetric bilaterally [Normal] : Alert and in no acute distress [Poor Appearance] : well-appearing [Acute Distress] : not in acute distress [de-identified] : The patient has no respiratory distress. Mood and affect are normal. The patient is alert and oriented to person, place and time. There is no pain with motion of the knees.  There is no tenderness of either knee.  The left ankle is tender and swollen in the area of the ATFL and the CFL.  There is no medial tenderness, swelling or deformity. The ankle is stable.  There is mild tenderness in the area of the lateral metatarsals.  The Achilles tendon is intact and nontender. Ankle dorsiflexion is 5 and plantar flexion 15. The skin of the ankle, midfoot and forefoot are intact. The calf is soft. [de-identified] : AP, lateral and mortise x-rays of the left ankle taken today demonstrate no fracture, no dislocation and no bony abnormality. AP, lateral and oblique x-rays of the left foot taken today demonstrate no fracture, no dislocation and no bony abnormality.

## 2024-06-04 NOTE — DISCUSSION/SUMMARY
[de-identified] : The patient has sustained a sprain to her left ankle and foot.  She is not comfortable in the cam boot and will be switched to a lace up ankle brace.  She will use a walker or scooter to limit weightbearing on her left foot.  She will apply ice topically.  She will be reevaluated in 3 weeks.  We may consider physical therapy.

## 2024-06-14 NOTE — PHYSICAL EXAM
[Dorsal Wrist] : dorsal wrist [Anatomic Snuff Box] : anatomic snuff box [Right] : right wrist [There are no fractures, subluxations or dislocations. No significant abnormalities are seen] : There are no fractures, subluxations or dislocations. No significant abnormalities are seen [] : negative Sy's

## 2024-06-14 NOTE — HISTORY OF PRESENT ILLNESS
[Dull/Aching] : dull/aching [Localized] : localized [Frequent] : frequent [de-identified] : 53 year old female presenting with RIGHT dorsal wrist pain with certain movement. She banged her hand into a door frame in Jan. She was seen at the ER, no fx.   She also had a fall form her bed and landed on the RIGHT elbow.   [] : no [FreeTextEntry1] : RT hand/wrist [FreeTextEntry5] : RT hand/wrist, elbow pain that developed 4 months ago from trying to slap a nail back into the wall. Had been treated Levine, Susan. \H[facility] a [facility] and Outlook.\"".

## 2024-06-27 ENCOUNTER — APPOINTMENT (OUTPATIENT)
Dept: ORTHOPEDIC SURGERY | Facility: CLINIC | Age: 54
End: 2024-06-27

## 2024-07-26 ENCOUNTER — APPOINTMENT (OUTPATIENT)
Dept: ORTHOPEDIC SURGERY | Facility: CLINIC | Age: 54
End: 2024-07-26
Payer: COMMERCIAL

## 2024-07-26 VITALS — BODY MASS INDEX: 38.82 KG/M2 | WEIGHT: 233 LBS | HEIGHT: 65 IN

## 2024-07-26 DIAGNOSIS — S46.912A STRAIN OF UNSPECIFIED MUSCLE, FASCIA AND TENDON AT SHOULDER AND UPPER ARM LEVEL, LEFT ARM, INITIAL ENCOUNTER: ICD-10-CM

## 2024-07-26 DIAGNOSIS — S40.012A CONTUSION OF LEFT SHOULDER, INITIAL ENCOUNTER: ICD-10-CM

## 2024-07-26 PROCEDURE — 73030 X-RAY EXAM OF SHOULDER: CPT | Mod: LT

## 2024-07-26 PROCEDURE — 99214 OFFICE O/P EST MOD 30 MIN: CPT

## 2024-07-26 RX ORDER — TRAMADOL HYDROCHLORIDE AND ACETAMINOPHEN 37.5; 325 MG/1; MG/1
37.5-325 TABLET, FILM COATED ORAL TWICE DAILY
Qty: 14 | Refills: 0 | Status: ACTIVE | COMMUNITY
Start: 2024-07-26 | End: 1900-01-01

## 2024-07-26 NOTE — PHYSICAL EXAM
[Left] : left shoulder [4 ___] : forward flexion 4[unfilled]/5 [4___] : abduction 4[unfilled]/5 [5___] : internal rotation 5[unfilled]/5 [] : no erythema [TWNoteComboBox7] : active forward flexion 145 degrees [de-identified] : active abduction 100 degrees [TWNoteComboBox6] : internal rotation 50 degrees [de-identified] : external rotation 60 degrees
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
## 2024-07-26 NOTE — DISCUSSION/SUMMARY
[de-identified] : hep and PT. short term pain meds. The patient's orthopaedic condition(s) would warrant consideration of consistent or intermittent use of a prescription strength non-steroidal anti-inflammatory medication.  These medications are associated with risks including but not limited to gastrointestinal irritation, kidney damage, hypertension, and bleeding.    The patient has one or more medical conditions that would make this class of medication a significant risk and was therefore not prescribed. mri left shouler to assess. follow up after mri.

## 2024-07-30 ENCOUNTER — APPOINTMENT (OUTPATIENT)
Dept: MRI IMAGING | Facility: CLINIC | Age: 54
End: 2024-07-30

## 2024-08-16 ENCOUNTER — APPOINTMENT (OUTPATIENT)
Dept: ORTHOPEDIC SURGERY | Facility: CLINIC | Age: 54
End: 2024-08-16

## 2024-08-19 ENCOUNTER — NON-APPOINTMENT (OUTPATIENT)
Age: 54
End: 2024-08-19

## 2024-08-28 ENCOUNTER — NON-APPOINTMENT (OUTPATIENT)
Age: 54
End: 2024-08-28

## 2024-08-29 ENCOUNTER — APPOINTMENT (OUTPATIENT)
Dept: MRI IMAGING | Facility: CLINIC | Age: 54
End: 2024-08-29
Payer: COMMERCIAL

## 2024-08-29 PROCEDURE — 73221 MRI JOINT UPR EXTREM W/O DYE: CPT | Mod: LT

## 2024-09-27 ENCOUNTER — APPOINTMENT (OUTPATIENT)
Dept: ORTHOPEDIC SURGERY | Facility: CLINIC | Age: 54
End: 2024-09-27
Payer: COMMERCIAL

## 2024-09-27 DIAGNOSIS — S46.012A STRAIN OF MUSCLE(S) AND TENDON(S) OF THE ROTATOR CUFF OF LEFT SHOULDER, INITIAL ENCOUNTER: ICD-10-CM

## 2024-09-27 DIAGNOSIS — S40.012A CONTUSION OF LEFT SHOULDER, INITIAL ENCOUNTER: ICD-10-CM

## 2024-09-27 PROCEDURE — 99214 OFFICE O/P EST MOD 30 MIN: CPT

## 2024-09-27 NOTE — PHYSICAL EXAM
[Left] : left shoulder [4 ___] : forward flexion 4[unfilled]/5 [4___] : abduction 4[unfilled]/5 [5___] : internal rotation 5[unfilled]/5 [] : no erythema [TWNoteComboBox7] : active forward flexion 155 degrees [de-identified] : active abduction 100 degrees [de-identified] : external rotation 60 degrees [TWNoteComboBox6] : internal rotation 50 degrees

## 2024-09-27 NOTE — DISCUSSION/SUMMARY
[de-identified] : HEP and PT  Cannot take nsaids from Crohn's. The patient's orthopaedic condition(s) would warrant consideration of consistent or intermittent use of a prescription strength non-steroidal anti-inflammatory medication.  These medications are associated with risks including but not limited to gastrointestinal irritation, kidney damage, hypertension, and bleeding.    The patient has one or more medical conditions that would make this class of medication a significant risk and was therefore not prescribed.  Follow up 6 weeks.  Will consider csi if pain persists.

## 2024-09-27 NOTE — DATA REVIEWED
[MRI] : MRI [Left] : left [Shoulder] : shoulder [I independently reviewed and interpreted images and report] : I independently reviewed and interpreted images and report [FreeTextEntry1] : mri looks like cuff mostly ok.  possible labral tear.
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
## 2024-12-20 NOTE — ED ADULT NURSE NOTE - NS ED NOTE ABUSE SUSPICION NEGLECT YN
Population Health Chart Review & Patient Outreach Details      Additional Pop Health Notes:               Updates Requested / Reviewed:      Updated Care Coordination Note and Care Team Updated         Health Maintenance Topics Overdue:      VBHM Score: 4     Cervical Cancer Screening  Eye Exam  Foot Exam  Uncontrolled BP                       Health Maintenance Topic(s) Outreach Outcomes & Actions Taken:    Eye Exam - Outreach Outcomes & Actions Taken  : External Records Requested & Care Team Updated if Applicable       No

## 2025-03-10 PROBLEM — M25.551 RIGHT HIP PAIN: Status: ACTIVE | Noted: 2025-03-10

## 2025-03-11 ENCOUNTER — APPOINTMENT (OUTPATIENT)
Dept: ORTHOPEDIC SURGERY | Facility: CLINIC | Age: 55
End: 2025-03-11

## 2025-03-11 DIAGNOSIS — M25.551 PAIN IN RIGHT HIP: ICD-10-CM

## 2025-03-13 ENCOUNTER — APPOINTMENT (OUTPATIENT)
Dept: ORTHOPEDIC SURGERY | Facility: CLINIC | Age: 55
End: 2025-03-13

## 2025-03-26 NOTE — ED ADULT TRIAGE NOTE - BP NONINVASIVE DIASTOLIC (MM HG)
(1) Mild-to-moderate aphasia; some obvious loss of fluency or facility of comprehension, without significant limitation on ideas expressed or form of expression. Reduction of speech and/or comprehension, however, makes conversation about provided material difficult or impossible. For example, in conversation about provided materials, examiner can identify picture or naming card content from patient's response. 83

## 2025-06-04 NOTE — ED ADULT TRIAGE NOTE - NS ED NOTE AC HIGH RISK COUNTRIES
- Daily weight monitoring and recording  - Monitor for signs and symptoms of heart failure daily.    - Weight gain, shortness of breath, increased swelling  - Follow a low sodium diet (2,000 mg or less daily) & Fluid restriction (64 oz or less daily)    Please call CHF clinic [phone] / [phone] if you have:  - Weight gain >2 pounds in a day or 5 pounds in a week  - Worsening shortness of breath  - Worsening lower extremity edema  - Abdominal bloating     No

## (undated) DEVICE — ELCTR GROUNDING PAD ADULT COVIDIEN

## (undated) DEVICE — PROTECTOR HEEL / ELBOW FLUFFY

## (undated) DEVICE — SUT MONOCRYL 4-0 27" PS-2 UNDYED

## (undated) DEVICE — ELCTR BOVIE TIP BLADE INSULATED 2.75" EDGE

## (undated) DEVICE — DRSG MASTISOL

## (undated) DEVICE — GLV 7 PROTEXIS (WHITE)

## (undated) DEVICE — PREP BETADINE SPONGE STICKS

## (undated) DEVICE — WARMING BLANKET LOWER ADULT

## (undated) DEVICE — VENODYNE/SCD SLEEVE CALF MEDIUM

## (undated) DEVICE — LABELS BLANK W PEN

## (undated) DEVICE — PACK MINOR NO DRAPE

## (undated) DEVICE — DRAPE THYROID 77" X 123"

## (undated) DEVICE — SUT CHROMIC 3-0 27" SH

## (undated) DEVICE — BIPOLAR FORCEP CORD WECK STANDARD 12FT

## (undated) DEVICE — DRSG STERISTRIPS 0.5 X 4"